# Patient Record
Sex: FEMALE | Race: BLACK OR AFRICAN AMERICAN | NOT HISPANIC OR LATINO | Employment: OTHER | URBAN - METROPOLITAN AREA
[De-identification: names, ages, dates, MRNs, and addresses within clinical notes are randomized per-mention and may not be internally consistent; named-entity substitution may affect disease eponyms.]

---

## 2018-10-30 ENCOUNTER — HISTORICAL (OUTPATIENT)
Dept: ADMINISTRATIVE | Facility: HOSPITAL | Age: 53
End: 2018-10-30

## 2018-10-30 LAB
APPEARANCE, UA: ABNORMAL
BACTERIA #/AREA URNS AUTO: ABNORMAL /HPF
BILIRUB UR QL STRIP: NEGATIVE MG/DL
COLOR UR: YELLOW
GLUCOSE (UA): NEGATIVE MG/DL
HGB UR QL STRIP: ABNORMAL UNIT/L
KETONES UR QL STRIP: NEGATIVE MG/DL
LEUKOCYTE ESTERASE UR QL STRIP: ABNORMAL UNIT/L
NITRITE UR QL STRIP.AUTO: NEGATIVE
PH UR STRIP: 7 [PH]
PROT UR QL STRIP: NEGATIVE MG/DL
RBC #/AREA URNS HPF: ABNORMAL /HPF
SP GR UR STRIP: 1.01
SQUAMOUS EPITHELIAL, UA: ABNORMAL /LPF
UROBILINOGEN UR STRIP-ACNC: 0.2 MG/DL
WBC #/AREA URNS AUTO: ABNORMAL /[HPF]

## 2019-11-14 ENCOUNTER — HISTORICAL (OUTPATIENT)
Dept: ADMINISTRATIVE | Facility: HOSPITAL | Age: 54
End: 2019-11-14

## 2019-11-14 LAB
ABS NEUT (OLG): 2.7 X10(3)/MCL (ref 2.1–9.2)
ALBUMIN SERPL-MCNC: 4.1 GM/DL (ref 3.4–5)
ALBUMIN/GLOB SERPL: 1.58 {RATIO} (ref 1.5–2.5)
ALP SERPL-CCNC: 46 UNIT/L (ref 38–126)
ALT SERPL-CCNC: 10 UNIT/L (ref 7–52)
AST SERPL-CCNC: 17 UNIT/L (ref 15–37)
BILIRUB SERPL-MCNC: 0.5 MG/DL (ref 0.2–1)
BILIRUBIN DIRECT+TOT PNL SERPL-MCNC: 0.1 MG/DL (ref 0–0.5)
BILIRUBIN DIRECT+TOT PNL SERPL-MCNC: 0.4 MG/DL
BUN SERPL-MCNC: 17 MG/DL (ref 7–18)
CALCIUM SERPL-MCNC: 9.2 MG/DL (ref 8.5–10)
CHLORIDE SERPL-SCNC: 108 MMOL/L (ref 98–107)
CHOLEST SERPL-MCNC: 196 MG/DL (ref 0–200)
CHOLEST/HDLC SERPL: 2.5 {RATIO}
CO2 SERPL-SCNC: 26 MMOL/L (ref 21–32)
CREAT SERPL-MCNC: 0.8 MG/DL (ref 0.6–1.3)
ERYTHROCYTE [DISTWIDTH] IN BLOOD BY AUTOMATED COUNT: 14.2 % (ref 11.5–17)
GLOBULIN SER-MCNC: 2.6 GM/DL (ref 1.2–3)
GLUCOSE SERPL-MCNC: 113 MG/DL (ref 74–106)
HCT VFR BLD AUTO: 38.7 % (ref 37–47)
HDLC SERPL-MCNC: 77 MG/DL (ref 35–60)
HGB BLD-MCNC: 12.9 GM/DL (ref 12–16)
LDLC SERPL CALC-MCNC: 104 MG/DL (ref 0–129)
LYMPHOCYTES # BLD AUTO: 2 X10(3)/MCL (ref 0.6–3.4)
LYMPHOCYTES NFR BLD AUTO: 40.1 % (ref 13–40)
MCH RBC QN AUTO: 30 PG (ref 27–31.2)
MCHC RBC AUTO-ENTMCNC: 33 GM/DL (ref 32–36)
MCV RBC AUTO: 90 FL (ref 80–94)
MONOCYTES # BLD AUTO: 0.4 X10(3)/MCL (ref 0.1–1.3)
MONOCYTES NFR BLD AUTO: 7.3 % (ref 0.1–24)
NEUTROPHILS NFR BLD AUTO: 52.6 % (ref 47–80)
PLATELET # BLD AUTO: 167 X10(3)/MCL (ref 130–400)
PMV BLD AUTO: 9.9 FL (ref 9.4–12.4)
POTASSIUM SERPL-SCNC: 4.6 MMOL/L (ref 3.5–5.1)
PROT SERPL-MCNC: 6.7 GM/DL (ref 6.4–8.2)
RBC # BLD AUTO: 4.3 X10(6)/MCL (ref 4.2–5.4)
SODIUM SERPL-SCNC: 141 MMOL/L (ref 136–145)
TRIGL SERPL-MCNC: 60 MG/DL (ref 30–150)
TSH SERPL-ACNC: 0.45 MIU/ML (ref 0.35–4.94)
VLDLC SERPL CALC-MCNC: 12 MG/DL
WBC # SPEC AUTO: 5.1 X10(3)/MCL (ref 4.5–11.5)

## 2020-05-19 ENCOUNTER — HISTORICAL (OUTPATIENT)
Dept: ADMINISTRATIVE | Facility: HOSPITAL | Age: 55
End: 2020-05-19

## 2020-05-19 LAB
ALBUMIN SERPL-MCNC: 4.4 GM/DL (ref 3.4–5)
ALBUMIN/GLOB SERPL: 1.42 {RATIO} (ref 1.5–2.5)
ALP SERPL-CCNC: 45 UNIT/L (ref 38–126)
ALT SERPL-CCNC: 14 UNIT/L (ref 7–52)
AST SERPL-CCNC: 21 UNIT/L (ref 15–37)
BILIRUB SERPL-MCNC: 0.4 MG/DL (ref 0.2–1)
BILIRUBIN DIRECT+TOT PNL SERPL-MCNC: 0.1 MG/DL (ref 0–0.5)
BILIRUBIN DIRECT+TOT PNL SERPL-MCNC: 0.3 MG/DL
BUN SERPL-MCNC: 16 MG/DL (ref 7–18)
CALCIUM SERPL-MCNC: 9.9 MG/DL (ref 8.5–10)
CHLORIDE SERPL-SCNC: 106 MMOL/L (ref 98–107)
CO2 SERPL-SCNC: 27 MMOL/L (ref 21–32)
CREAT SERPL-MCNC: 0.88 MG/DL (ref 0.6–1.3)
GLOBULIN SER-MCNC: 3.1 GM/DL (ref 1.2–3)
GLUCOSE SERPL-MCNC: 96 MG/DL (ref 74–106)
POTASSIUM SERPL-SCNC: 4.6 MMOL/L (ref 3.5–5.1)
PROT SERPL-MCNC: 7.5 GM/DL (ref 6.4–8.2)
SODIUM SERPL-SCNC: 141 MMOL/L (ref 136–145)
TSH SERPL-ACNC: 0.67 MIU/ML (ref 0.35–4.94)

## 2021-02-26 ENCOUNTER — HISTORICAL (OUTPATIENT)
Dept: ADMINISTRATIVE | Facility: HOSPITAL | Age: 56
End: 2021-02-26

## 2021-05-10 ENCOUNTER — HISTORICAL (OUTPATIENT)
Dept: ADMINISTRATIVE | Facility: HOSPITAL | Age: 56
End: 2021-05-10

## 2021-05-10 LAB
ABS NEUT (OLG): 3.6 X10(3)/MCL (ref 2.1–9.2)
ALBUMIN SERPL-MCNC: 4.4 GM/DL (ref 3.4–5)
ALBUMIN/GLOB SERPL: 1.69 {RATIO} (ref 1.5–2.5)
ALP SERPL-CCNC: 49 UNIT/L (ref 38–126)
ALT SERPL-CCNC: 14 UNIT/L (ref 7–52)
AST SERPL-CCNC: 19 UNIT/L (ref 15–37)
BILIRUB SERPL-MCNC: 0.6 MG/DL (ref 0.2–1)
BILIRUBIN DIRECT+TOT PNL SERPL-MCNC: 0.1 MG/DL (ref 0–0.5)
BILIRUBIN DIRECT+TOT PNL SERPL-MCNC: 0.5 MG/DL
BUN SERPL-MCNC: 18 MG/DL (ref 7–18)
CALCIUM SERPL-MCNC: 9.7 MG/DL (ref 8.5–10)
CHLORIDE SERPL-SCNC: 106 MMOL/L (ref 98–107)
CHOLEST SERPL-MCNC: 242 MG/DL (ref 0–200)
CHOLEST/HDLC SERPL: 2.8 {RATIO}
CO2 SERPL-SCNC: 28 MMOL/L (ref 21–32)
CREAT SERPL-MCNC: 0.77 MG/DL (ref 0.6–1.3)
ERYTHROCYTE [DISTWIDTH] IN BLOOD BY AUTOMATED COUNT: 15.2 % (ref 11.5–17)
EST CREAT CLEARANCE SER (OHS): 93.89 ML/MIN
GLOBULIN SER-MCNC: 2.6 GM/DL (ref 1.2–3)
GLUCOSE SERPL-MCNC: 104 MG/DL (ref 74–106)
HCT VFR BLD AUTO: 37.1 % (ref 37–47)
HDLC SERPL-MCNC: 85 MG/DL (ref 35–60)
HGB BLD-MCNC: 11.8 GM/DL (ref 12–16)
LDLC SERPL CALC-MCNC: 133 MG/DL (ref 0–129)
LYMPHOCYTES # BLD AUTO: 2.3 X10(3)/MCL (ref 0.6–3.4)
LYMPHOCYTES NFR BLD AUTO: 36.9 % (ref 13–40)
MCH RBC QN AUTO: 28.7 PG (ref 27–31.2)
MCHC RBC AUTO-ENTMCNC: 32 GM/DL (ref 32–36)
MCV RBC AUTO: 90 FL (ref 80–94)
MONOCYTES # BLD AUTO: 0.4 X10(3)/MCL (ref 0.1–1.3)
MONOCYTES NFR BLD AUTO: 6.5 % (ref 0.1–24)
NEUTROPHILS NFR BLD AUTO: 56.6 % (ref 47–80)
PLATELET # BLD AUTO: 221 X10(3)/MCL (ref 130–400)
PMV BLD AUTO: 10.2 FL (ref 9.4–12.4)
POTASSIUM SERPL-SCNC: 4.5 MMOL/L (ref 3.5–5.1)
PROT SERPL-MCNC: 7 GM/DL (ref 6.4–8.2)
RBC # BLD AUTO: 4.11 X10(6)/MCL (ref 4.2–5.4)
SODIUM SERPL-SCNC: 142 MMOL/L (ref 136–145)
TRIGL SERPL-MCNC: 88 MG/DL (ref 30–150)
TSH SERPL-ACNC: 0.69 MIU/ML (ref 0.35–4.94)
VLDLC SERPL CALC-MCNC: 17.6 MG/DL
WBC # SPEC AUTO: 6.3 X10(3)/MCL (ref 4.5–11.5)

## 2022-04-10 ENCOUNTER — HISTORICAL (OUTPATIENT)
Dept: ADMINISTRATIVE | Facility: HOSPITAL | Age: 57
End: 2022-04-10
Payer: COMMERCIAL

## 2022-04-26 VITALS
DIASTOLIC BLOOD PRESSURE: 74 MMHG | SYSTOLIC BLOOD PRESSURE: 118 MMHG | HEIGHT: 66 IN | WEIGHT: 160.69 LBS | BODY MASS INDEX: 25.83 KG/M2

## 2022-05-02 NOTE — HISTORICAL [facility]
This is a historical note converted from Clary. Formatting and pictures may have been removed.  Please reference Clary for original formatting and attached multimedia. Chief Complaint  WELLNESS CPX FAST  History of Present Illness  56 year old AAF presents for annual wellness.  ?   c/o recurrent yeast infections. ?Admits that she is?bathing, using?heavily?perfumed?soaps?and washes.  ?   Mood stable on citalopram.? ADHD?well managed with?as needed?Adderall.  ?  ?   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 EtOH socially  Exercising with son (he is a ),.  ?   GYN- Dr. Bonilla- PAP UTD; MMG > 5yrs.  Colonoscopy 7/20- Dr. Winston, DUE 2027.  Review of Systems  Constitutional:?no fever, no fatigue, no weakness  Psych: no anxiety,?no?depression, no insomnia  Eye:?no vision loss, no eye redness, no drainage, or pain  ENMT:?no sore throat, no ear pain, no sinus pain/congestion  Respiratory:?no cough, no wheezing, no shortness of breath  Cardiovascular:?no chest pain, no palpitations, no edema  Gastrointestinal:?no abdominal pain, no nausea, vomiting, diarrhea or constipation  Genitourinary:?no urinary frequency,? urgency, or incontinence, no dysuria, no hematuria  Hema/Lymph:?no abnormal bruising or bleeding  Endocrine:?no heat or cold intolerance, no excessive thirst or excessive urination  Musculoskeletal:?no muscle or joint pain, no joint swelling  Integumentary:?no skin rash or abnormal lesion  Neurologic: no headache, no dizziness, no weakness or numbness  ?  Physical Exam  Vitals & Measurements  T:?36.6? ?C (Oral)? HR:?64(Peripheral)? BP:?118/74?  HT:?167.00?cm? HT:?167?cm? WT:?72.900?kg? WT:?72.9?kg? BMI:?26.14?  General:?well-developed well-nourished in no acute distress  Eye: PERRLA, EOMI, clear conjunctiva, eyelids normal  HENT:?TMs/ear canals clear, oropharynx without erythema/exudate, oropharynx and nasal mucosal surfaces moist, no maxillary/frontal sinus tenderness to  palpation  Neck: full range of motion, no thyromegaly, no?lymphadenopathy, no carotid bruits  Respiratory:?respirations even and unlabored, clear to auscultation bilaterally  Cardiovascular:?regular rate and rhythm without murmurs, gallops or rubs  Gastrointestinal:?soft, non-tender, non-distended with normal bowel sounds, no palpable masses  Genitourinary: no CVA tenderness  Musculoskeletal:?full range of motion of all extremities/spine without limitation or discomfort  Integumentary: no rashes or skin lesions present  Neurologic: cranial nerves intact, no signs of peripheral neurological deficit, motor/sensory function intact  ?  Assessment/Plan  1.?Encounter for wellness examination?Z00.00  Fasting?labs completed (CBC, CMP, TSH, FLP); will call patient with results.???  ?   Preventative: We discussed a? healthy diet (healthy food choices, reduce portions and overall calorie intake), continue to exercise?at least?30-45 minutes 3x per week,?Avoid excessive alcohol.??Immunizations and preventative exams discussed.  ?   MMG overdue.? Order?in chart.  Ordered:  Automated Diff, Routine collect, 05/10/21 14:08:00 CDT, Blood, Collected, Stop date 05/10/21 14:08:00 CDT, Lab Collect, Encounter for wellness examination, 05/10/21 14:08:00 CDT  CBC w/ Auto Diff, Routine collect, 05/10/21 14:08:00 CDT, Blood, Stop date 05/10/21 14:08:00 CDT, Lab Collect, Encounter for wellness examination, 05/10/21 14:08:00 CDT  Comprehensive Metabolic Panel, Routine collect, 05/10/21 14:08:00 CDT, Blood, Stop date 05/10/21 14:08:00 CDT, Lab Collect, Encounter for wellness examination, 05/10/21 14:08:00 CDT  Lipid Panel, Routine collect, 05/10/21 14:08:00 CDT, Blood, Stop date 05/10/21 14:08:00 CDT, Lab Collect, Encounter for wellness examination, 05/10/21 14:08:00 CDT  Thyroid Stimulating Hormone, Routine collect, 05/10/21 14:08:00 CDT, Blood, Stop date 05/10/21 14:08:00 CDT, Lab Collect, Encounter for wellness examination, 05/10/21 14:08:00  CDT  ?  2.?ADHD?F90.9  ?Continue?Adderall 10 mg?every morning?-Rx x1.  Patient will contact office?when refill needed.  Ordered:  Clinic Follow up, *Est. 08/10/21 3:00:00 CDT, Order for future visit, ADHD  Depression, [facility]  Lab Collection Request, 05/10/21 13:52:00 CDT, [facility], 05/10/21 13:52:00 CDT, ADHD  [facility] 40-64 years [id number] PC, ADHD, [facility], 05/10/21 13:52:00 CDT  ?  3.?Depression?F32.9  ?Continue/refill citalopram 20 mg daily.  Ordered:  Clinic Follow up, *Est. 08/10/21 3:00:00 CDT, Order for future visit, ADHD  Depression, [facility]  ?  4.?Candida vaginitis?B37.3  ?Diflucan 150 mg?daily?x3 days.  Lengthy discussion with patient?to limit?bathing,?avoid?perfumed soaps?and lotions, rinse well.  Patient instructed to contact office if symptoms worsen or fail to improve.  ?  Orders:  amphetamine-dextroamphetamine, 10 mg = 1 tab(s), Oral, qAM, # 30 tab(s), 0 Refill(s)  citalopram, See Instructions, TAKE 1 TABLET BY MOUTH EVERY DAY, # 30 tab(s), 5 Refill(s), Pharmacy: [facility]/pharmacy #[id number], 167, cm, Height/Length Dosing, 05/10/21 13:48:00 CDT, 72.9, kg, Weight Dosing, 05/10/21 13:48:00 CDT  fluconazole, 150 mg = 1 tab(s), Oral, Daily, # 3 tab(s), 0 Refill(s), Pharmacy: [facility]/pharmacy #[id number], 167, cm, Height/Length Dosing, 05/10/21 13:48:00 CDT, 72.9, kg, Weight Dosing, 05/10/21 13:48:00 CDT  Schedule Diagnostics Study, MMG, any, 05/10/21 14:11:00 CDT, Screening for breast cancer  Referrals  Clinic Follow up, *Est. 08/10/21 3:00:00 CDT, Order for future visit, ADHD  Depression, [facility]   Problem List/Past Medical History  Ongoing  ADHD  Depression  Uterine fibroid  Historical  No qualifying data  Procedure/Surgical History  Colonoscopy (07/15/2020)  RIGHT KNEE SURGERY   Medications  Adderall 10 mg oral tablet, 10 mg= 1 tab(s), Oral, qAM  citalopram 20 mg oral tablet, See Instructions, 5 refills  Diflucan 150 mg oral tablet, 150 mg= 1 tab(s), Oral, Daily  multivitamin with  minerals (Adult Tab), 1 tab(s), Oral, Daily  Allergies  penicillin?(UNKNOWN)  Social History  Abuse/Neglect  No, 05/10/2021  Alcohol  Current, 3-5 times per week, 10/30/2018  Employment/School  Employed, 10/30/2018  Exercise  Exercise duration: 60. Exercise frequency: 3-4 times/week. Exercise type: Aerobics, Weight lifting., 05/10/2021  Home/Environment  Lives with Children., 10/30/2018  Nutrition/Health  Regular, 10/30/2018  Substance Use  Never, 10/30/2018  Tobacco  Former smoker, quit more than 30 days ago, N/A, 05/10/2021  4 or less cigarettes(less than 1/4 pack)/day in last 30 days, Cigarettes, No, 01/25/2021  Family History  Acute myocardial infarction.: Mother and Grandmother.  Immunizations  Vaccine Date Status   influenza virus vaccine, inactivated 11/14/2019 Given   influenza virus vaccine, inactivated 10/30/2018 Given   Health Maintenance  Health Maintenance  ???Pending?(in the next year)  ??? ??OverDue  ??? ? ? ?Influenza Vaccine due??10/01/20??and every 1??day(s)  ??? ? ? ?Alcohol Misuse Screening due??01/02/21??and every 1??year(s)  ??? ??Due?  ??? ? ? ?ADL Screening due??05/10/21??and every 1??year(s)  ??? ? ? ?Breast Cancer Screening due??05/10/21??Unknown Frequency  ??? ? ? ?Tetanus Vaccine due??05/10/21??and every 10??year(s)  ??? ? ? ?Zoster Vaccine due??05/10/21??Unknown Frequency  ??? ??Due In Future?  ??? ? ? ?Obesity Screening not due until??01/01/22??and every 1??year(s)  ??? ? ? ?Aspirin Therapy for CVD Prevention not due until??02/26/22??and every 1??year(s)  ???Satisfied?(in the past 1 year)  ??? ??Satisfied?  ??? ? ? ?Aspirin Therapy for CVD Prevention on??02/26/21.??Satisfied by LEXI Bill Teddi  ??? ? ? ?Blood Pressure Screening on??05/10/21.??Satisfied by Erika Ambriz LPN  ??? ? ? ?Body Mass Index Check on??05/10/21.??Satisfied by Erika Ambriz LPN  ??? ? ? ?Cervical Cancer Screening on??11/24/20.??Satisfied by Jovan Mark  ??? ? ? ?Colorectal Screening  on??07/15/20.??Satisfied by Rae Cox  ??? ? ? ?Depression Screening on??05/10/21.??Satisfied by Erika Ambriz LPN  ??? ? ? ?Diabetes Screening on??05/19/20.??Satisfied by Fanny Ramirez  ??? ? ? ?Influenza Vaccine on??02/26/21.??Satisfied by Erika Ambriz LPN  ??? ? ? ?Obesity Screening on??05/10/21.??Satisfied by Erika Ambriz LPN  ?      Patient condition discussed?in detail with nurse practitioner.? Agree with plan of care?and follow-up.

## 2022-05-02 NOTE — HISTORICAL [facility]
This is a historical note converted from Clary. Formatting and pictures may have been removed.  Please reference Clary for original formatting and attached multimedia. Chief Complaint  6 MTH HAIR LOSS AND VERY ITCHY SCALP, HAS LOST ALL OF HER, HAVING SEVERE YEAST INFECTION, HX FIBROID TUMORS NEVER F/U  History of Present Illness  55 year old AAF complains of hair falling out that has worsened over past 6 months. Hair is very brittle and falls out by the clumps. Only change is she dyed her hair about 6 months ago. No change in day to day hair products. Scalp has also been very itchy past 2 weeks.  Review of Systems  Constitutional:?no fever, no weakness, no weight loss, no fatigue  Musculoskeletal:?no muscle or joint pain, no joint swelling  Integumentary:?See HPI  Neuro:?no headaches, dizziness, or weakness  Physical Exam  Vitals & Measurements  T:?36.6? ?C (Oral)? HR:?60(Peripheral)? BP:?122/76?  HT:?167?cm? WT:?66.6?kg? BMI:?23.88?  General:?Well developed, well-nourished, in no acute distress  M/S:?no tenderness, joints WNL, FROM, neg SLR, no CCE  Neuro:?no motor/sensory deficits, Reflexes 2+ throughout, CN II-XII intact  Integumentary:?slight erythema to posterior scalp  ?? No alopecia noted  Assessment/Plan  1.?Hair loss?L65.9  ?CMP, TSH  Ordered:  Comprehensive Metabolic Panel, Now collect, 05/19/20 16:11:00 CDT, Blood, Order for future visit, Stop date 05/19/20 16:11:00 CDT, Lab Collect, Hair loss, 05/19/20 16:11:00 CDT  Lab Collection Request, 05/19/20 16:11:00 CDT, [facility], 05/19/20 16:11:00 CDT, Hair loss  Office/Outpatient Visit Level 3 Established [id number] PC, Hair loss  Seborrheic dermatitis of scalp, [facility], 05/19/20 16:11:00 CDT  Thyroid Stimulating Hormone, Routine collect, 05/19/20 16:11:00 CDT, Blood, Order for future visit, Stop date 05/19/20 16:11:00 CDT, Lab Collect, Hair loss, 05/19/20 16:11:00 CDT  ?  2.?Seborrheic dermatitis of scalp?L21.9  ?Clobetasol 0.05% shampoo as  directed (118ml, NR)  Ordered:  Office/Outpatient Visit Level 3 Established [id number] PC, Hair loss  Seborrheic dermatitis of scalp, [facility], 05/19/20 16:11:00 CDT  ?  Orders:  clobetasol topical, 1 rajeev, TOP, Daily, # 118 mL, 1 Refill(s), Pharmacy: [facility]/pharmacy #[id number], 167, cm, Height/Length Dosing, 05/19/20 15:36:00 CDT, 66.6, kg, Weight Dosing, 05/19/20 15:36:00 CDT  Clinic Follow-up PRN, 05/19/20 16:11:00 CDT, [facility], Future Order  Referrals  Clinic Follow-up PRN, 05/19/20 16:11:00 CDT, [facility], Future Order   Problem List/Past Medical History  Ongoing  Uterine fibroid  Historical  No qualifying data  Procedure/Surgical History  RIGHT KNEE SURGERY   Medications  Adderall 10 mg oral tablet, 10 mg= 1 tab(s), Oral, qAM  biotin 300 mcg oral tablet, 300 mcg= 1 tab(s), Oral, Daily  clobetasol 0.05% topical shampoo, 1 rajeev, TOP, Daily, 1 refills  Ibuprofen 400 mg tablet, 2 tab(s), Oral, q4hr, PRN  multivitamin with minerals (Adult Tab), 1 tab(s), Oral, Daily  Zoloft 25 mg oral tablet, 25 mg= 1 tab(s), Oral, Daily, 5 refills  Allergies  penicillin?(UNKNOWN)  Social History  Abuse/Neglect  No, 05/19/2020  Alcohol  Current, 3-5 times per week, 10/30/2018  Employment/School  Employed, 10/30/2018  Home/Environment  Lives with Children., 10/30/2018  Nutrition/Health  Regular, 10/30/2018  Substance Use  Never, 10/30/2018  Tobacco  4 or less cigarettes(less than 1/4 pack)/day in last 30 days, No, 05/19/2020  4 or less cigarettes(less than 1/4 pack)/day in last 30 days, N/A, 01/08/2019  Current every day smoker, Cigarettes, No, 2 per day., 11/27/2018  Family History  Acute myocardial infarction.: Mother and Grandmother.  Immunizations  Vaccine Date Status   influenza virus vaccine, inactivated 11/14/2019 Given   influenza virus vaccine, inactivated 10/30/2018 Given   Health Maintenance  Health Maintenance  ???Pending?(in the next year)  ??? ??OverDue  ??? ? ? ?Diabetes Screening due??and every?  ??? ? ?  ?Alcohol Misuse Screening due??01/01/20??and every 1??year(s)  ??? ? ? ?Smoking Cessation due??01/01/20??Variable frequency  ??? ??Due?  ??? ? ? ?ADL Screening due??05/19/20??and every 1??year(s)  ??? ? ? ?Aspirin Therapy for CVD Prevention due??05/19/20??and every 1??year(s)  ??? ? ? ?Breast Cancer Screening due??05/19/20??and every?  ??? ? ? ?Cervical Cancer Screening due??05/19/20??and every?  ??? ? ? ?Colorectal Screening due??05/19/20??and every?  ??? ? ? ?Lung Cancer Screening due??05/19/20??and every 1??year(s)  ??? ? ? ?Tetanus Vaccine due??05/19/20??and every 10??year(s)  ??? ??Due In Future?  ??? ? ? ?Blood Pressure Screening not due until??11/13/20??and every 1??year(s)  ??? ? ? ?Body Mass Index Check not due until??11/13/20??and every 1??year(s)  ??? ? ? ?Depression Screening not due until??11/13/20??and every 1??year(s)  ??? ? ? ?Obesity Screening not due until??01/01/21??and every 1??year(s)  ???Satisfied?(in the past 1 year)  ??? ??Satisfied?  ??? ? ? ?Blood Pressure Screening on??05/19/20.??Satisfied by Erika Ambriz LPN  ??? ? ? ?Body Mass Index Check on??05/19/20.??Satisfied by Erika Ambriz LPN  ??? ? ? ?Depression Screening on??05/19/20.??Satisfied by Erika Ambriz LPN  ??? ? ? ?Diabetes Screening on??11/14/19.??Satisfied by Fanny Ramirez  ??? ? ? ?Influenza Vaccine on??11/14/19.??Satisfied by Norma Ramirze CMA  ??? ? ? ?Lipid Screening on??11/14/19.??Satisfied by Fanny Ramirez  ??? ? ? ?Obesity Screening on??05/19/20.??Satisfied by Erika Ambriz LPN  ?
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
## 2022-05-02 NOTE — HISTORICAL [facility]
This is a historical note converted from [place]. Formatting and pictures may have been removed.  Please reference [place] for original formatting and attached multimedia. Chief Complaint  NP C/O BURNING WITH URINATION, ITCHING, LOWER BACK PAIN, STAYS WITH YEAST INFECTION.  History of Present Illness  Complains of urinary urgency, dysuria x 1 week.  ?  Complains of worsening anxiety and depression since sudden death of  few weeks ago. Admits to having significant anxiety for past 10-15 years, just much worse since husbands death. Would like to start medicine since she is now having to deal with all finances for family and is very overwhelmed.  Review of Systems  Constitutional:?no fever, fatigue, weakness  Gastrointestinal:?no nausea, vomiting, or diarrhea. No abdominal pain  Genitourinary:?+ dysuria,?+ urinary frequency?+ urgency, no hematuria  Hema/Lymph:?no abnormal bruising or bleeding  Endocrine:?no heat or cold intolerance, no excessive thirst or excessive urination  Musculoskeletal:?no muscle or joint pain, no joint swelling  Integumentary:?no skin rash or abnormal lesion  Neurologic: no headache, no dizziness, no weakness or numbness  ?  Physical Exam  Vitals & Measurements  T:?36.5? ?C (Oral)? HR:?64(Peripheral)? BP:?126/84?  HT:?170?cm? HT:?170?cm? WT:?65.5?kg? WT:?65.5?kg? BMI:?22.66?  General:?well-developed well-nourished in no acute distress  Gastrointestinal:?soft, non-tender, non-distended with normal bowel sounds, without masses to palpation  Genitourinary: no CVA tenderness to palpation  Musculoskeletal:?full range of motion of all extremities/spine without limitation or discomfort  Integumentary: no rashes or skin lesions present  Neurologic: cranial nerves intact, no signs of peripheral neurological deficit, motor/sensory function intact  ?  Assessment/Plan  1.?Acute UTI  ?Cipro 500mg PO BID (10, NR)  ?Decrease caffeine, increase water  Ordered:  Office/Outpatient Visit Level 3 New [id number] ,  Acute UTI  Anxiety, [facility], 10/30/18 14:34:00 CDT  ?  2.?Anxiety  ?Recent death of  but also with significant anxiety for many years  ?Start Celexa 20mg PO q day  Ordered:  Clinic Follow up, *Est. 11/27/18 3:00:00 CST, Order for future visit, Anxiety, [facility]  Office/Outpatient Visit Level 3 New [id number] PC, Acute UTI  Anxiety, [facility], 10/30/18 14:34:00 CDT  ?  Needs flu shot  ?  UTI symptoms  ?  Orders:  ciprofloxacin, 500 mg = 1 tab(s), Oral, q12hr, X 7 day(s), # 14 tab(s), 0 Refill(s), Pharmacy: [facility]/pharmacy #[id number]  citalopram, 20 mg = 1 tab(s), Oral, Daily, # 30 tab(s), 5 Refill(s), Pharmacy: [facility]/pharmacy #[id number]   Problem List/Past Medical History  Ongoing  Uterine fibroid  Historical  No qualifying data  Procedure/Surgical History  RIGHT KNEE SURGERY   Medications  biotin 300 mcg oral tablet, 300 mcg= 1 tab(s), Oral, Daily  Celexa 20 mg oral tablet, 20 mg= 1 tab(s), Oral, Daily, 5 refills  Cipro 500 mg oral tablet, 500 mg= 1 tab(s), Oral, q12hr  Ibuprofen 400 mg tablet, 2 tab(s), Oral, q4hr, PRN  multivitamin with minerals (Adult Tab), 1 tab(s), Oral, Daily  Allergies  penicillin?(UNKNOWN)  Social History  Alcohol  Current, 3-5 times per week, 10/30/2018  Employment/School  Employed, 10/30/2018  Home/Environment  Lives with Children., 10/30/2018  Nutrition/Health  Regular, 10/30/2018  Substance Abuse  Never, 10/30/2018  Tobacco  Current every day smoker, Cigarettes, No, 2 per day., 10/30/2018  Family History  Acute myocardial infarction.: Mother and Grandmother.  Immunizations  Vaccine Date Status   influenza virus vaccine, inactivated 10/30/2018 Given   Health Maintenance  Health Maintenance  ???Pending?(in the next year)  ??? ??Due?  ??? ? ? ?ADL Screening due??10/30/18??and every 1??year(s)  ??? ? ? ?Alcohol Misuse Screening due??10/30/18??and every 1??year(s)  ??? ? ? ?Smoking Cessation due??10/30/18??Variable frequency  ??? ? ? ?Tetanus Vaccine due??10/30/18??and every  10??year(s)  ???Satisfied?(in the past 1 year)  ??? ??Satisfied?  ??? ? ? ?Blood Pressure Screening on??10/30/18.??Satisfied by Erika Ambriz  ??? ? ? ?Body Mass Index Check on??10/30/18.??Satisfied by Erika Ambriz  ??? ? ? ?Influenza Vaccine on??10/30/18.??Satisfied by Erika Ambriz  ??? ? ? ?Obesity Screening on??10/30/18.??Satisfied by Erika Ambriz  ?  ?

## 2022-05-02 NOTE — HISTORICAL [facility]ER
This is a historical note converted from [employer]. Formatting and pictures may have been removed.  Please reference [employer] for original formatting and attached multimedia. Chief Complaint  CPX FASTING / EVAL LEFT KNEE  History of Present Illness  54 year old AAF presents for annual wellness  No PMH, No Meds  ?  , 2 kids, Works as caretaker for [employer]  No Tob, No EtOH  No exercise  ?  Last MMG >5 years ago  No Colonoscopy to date  Review of Systems  Constitutional:?no fever, fatigue, weakness  Eye:?no vision loss, eye redness, drainage, or pain  ENMT:?no sore throat, ear pain, sinus pain/congestion, nasal congestion/drainage  Respiratory:?no cough, no wheezing, no shortness of breath  Cardiovascular:?no chest pain, no palpitations, no edema  Gastrointestinal:?no nausea, vomiting, or diarrhea. No abdominal pain  Genitourinary:?no dysuria, no urinary frequency or urgency, no hematuria  Hema/Lymph:?no abnormal bruising or bleeding  Endocrine:?no heat or cold intolerance, no excessive thirst or excessive urination  Musculoskeletal:?no muscle or joint pain, no joint swelling  Integumentary:?no skin rash or abnormal lesion  Neurologic: no headache, no dizziness, no weakness or numbness  ?  Physical Exam  Vitals & Measurements  T:?36.8? ?C (Oral)? HR:?66(Peripheral)? BP:?130/78?  HT:?167?cm? WT:?64.1?kg? BMI:?22.98?  General:?well-developed well-nourished in no acute distress  Eye: PERRLA, EOMI, clear conjunctiva, eyelids normal  HENT:?TMs/ear canals clear, oropharynx without erythema/exudate, oropharynx and nasal mucosal surfaces moist, no maxillary/frontal sinus tenderness to palpation  Neck: full range of motion, no thyromegaly or lymphadenopathy  Respiratory:?clear to auscultation bilaterally  Cardiovascular:?regular rate and rhythm without murmurs, gallops or rubs  Gastrointestinal:?soft, non-tender, non-distended with normal bowel sounds, without masses to palpation  Genitourinary: no CVA tenderness  to palpation  Musculoskeletal:?full range of motion of all extremities/spine without limitation or discomfort  Integumentary: no rashes or skin lesions present  Neurologic: cranial nerves intact, no signs of peripheral neurological deficit, motor/sensory function intact  ?  Assessment/Plan  1.?Wellness examination?Z00.00  ?LABS: CBC, CMP, TSH, FLP  Ordered:  CBC w/ Auto Diff, Routine collect, 11/14/19 10:56:00 CST, Blood, Order for future visit, Stop date 11/14/19 10:56:00 CST, Lab Collect, Wellness examination, 11/14/19 10:56:00 CST  Comprehensive Metabolic Panel, Now collect, 11/14/19 10:56:00 CST, Blood, Order for future visit, Stop date 11/14/19 10:56:00 CST, Lab Collect, Wellness examination, 11/14/19 10:56:00 CST  Lipid Panel, Routine collect, 11/14/19 10:56:00 CST, Blood, Order for future visit, Stop date 11/14/19 10:56:00 CST, Lab Collect, Wellness examination, 11/14/19 10:56:00 CST  Preventative Health Care Est 40-64 years [id number] PC, Wellness examination, [facility], 11/14/19 10:56:00 CST  Thyroid Stimulating Hormone, Routine collect, 11/14/19 10:56:00 CST, Blood, Order for future visit, Stop date 11/14/19 10:56:00 CST, Lab Collect, Wellness examination, 11/14/19 10:56:00 CST  ?  2.?Left knee pain?M25.562  ?Possible MCL tear due to instability and valgus deformity on xray  ?Also with Pes Anserine Bursitis  ?NSAIDs, Handout given for instructions/stretches  Ordered:  XR Knee Left 1 or 2 Views, Routine, 11/14/19 10:59:00 CST, Other (please specify), None, Ambulatory, Rad Type, Left knee pain, [facility], 11/14/19 10:59:00 CST  ?  Orders:  Clinic Follow-up PRN, 11/14/19 10:56:00 CST, [facility], Future Order  Lab Collection Request, 11/14/19 10:56:00 CST, [facility], 11/14/19 10:56:00 CST  Schedule Diagnostics Study, Screening colonoscopy, First available, 11/14/19 10:54:00 CST, Colon cancer screening  Refer for screening colonoscopy  Referrals  Clinic Follow-up PRN, 11/14/19  10:56:00 CST, [facility], Future Order   Problem List/Past Medical History  Ongoing  Uterine fibroid  Historical  No qualifying data  Procedure/Surgical History  RIGHT KNEE SURGERY   Medications  Adderall 10 mg oral tablet, 10 mg= 1 tab(s), Oral, qAM  biotin 300 mcg oral tablet, 300 mcg= 1 tab(s), Oral, Daily  Ibuprofen 400 mg tablet, 2 tab(s), Oral, q4hr, PRN  multivitamin with minerals (Adult Tab), 1 tab(s), Oral, Daily  Zoloft 25 mg oral tablet, 25 mg= 1 tab(s), Oral, Daily, 5 refills  Allergies  penicillin?(UNKNOWN)  Social History  Abuse/Neglect  No, 11/14/2019  Alcohol  Current, 3-5 times per week, 10/30/2018  Employment/School  Employed, 10/30/2018  Home/Environment  Lives with Children., 10/30/2018  Nutrition/Health  Regular, 10/30/2018  Substance Use  Never, 10/30/2018  Tobacco  4 or less cigarettes(less than 1/4 pack)/day in last 30 days, No, 11/14/2019  4 or less cigarettes(less than 1/4 pack)/day in last 30 days, N/A, 01/08/2019  Current every day smoker, Cigarettes, No, 2 per day., 11/27/2018  Family History  Acute myocardial infarction.: Mother and Grandmother.  Immunizations  Vaccine Date Status   influenza virus vaccine, inactivated 11/14/2019 Given   influenza virus vaccine, inactivated 10/30/2018 Given   Health Maintenance  Health Maintenance  ???Pending?(in the next year)  ??? ??OverDue  ??? ? ? ?Influenza Vaccine due??and every?  ??? ? ? ?Alcohol Misuse Screening due??01/01/19??and every 1??year(s)  ??? ? ? ?Smoking Cessation due??01/01/19??Variable frequency  ??? ??Due?  ??? ? ? ?ADL Screening due??11/14/19??and every 1??year(s)  ??? ? ? ?Breast Cancer Screening due??11/14/19??and every?  ??? ? ? ?Cervical Cancer Screening due??11/14/19??and every?  ??? ? ? ?Colorectal Screening due??11/14/19??and every?  ??? ? ? ?Diabetes Screening due??11/14/19??and every?  ??? ? ? ?Lipid Screening due??11/14/19??and every?  ??? ? ? ?Tetanus Vaccine due??11/14/19??and every 10??year(s)  ??? ??Due In  Future?  ??? ? ? ?Obesity Screening not due until??01/01/20??and every 1??year(s)  ??? ? ? ?Blood Pressure Screening not due until??11/13/20??and every 1??year(s)  ??? ? ? ?Body Mass Index Check not due until??11/13/20??and every 1??year(s)  ??? ? ? ?Depression Screening not due until??11/13/20??and every 1??year(s)  ???Satisfied?(in the past 1 year)  ??? ??Satisfied?  ??? ? ? ?Blood Pressure Screening on??11/14/19.??Satisfied by Norma Ramirez CMA  ??? ? ? ?Body Mass Index Check on??11/14/19.??Satisfied by Norma Ramirez CMA  ??? ? ? ?Depression Screening on??11/14/19.??Satisfied by Norma Ramirez CMA  ??? ? ? ?Influenza Vaccine on??11/14/19.??Satisfied by Norma Ramirez CMA  ??? ? ? ?Obesity Screening on??11/14/19.??Satisfied by Norma Ramirez CMA  ?      Patient condition discussed?in detail with nurse practitioner.? Agree with plan of care?and follow-up.

## 2022-11-19 ENCOUNTER — HOSPITAL ENCOUNTER (OUTPATIENT)
Facility: HOSPITAL | Age: 57
Discharge: HOME | End: 2022-11-20
Attending: EMERGENCY MEDICINE | Admitting: INTERNAL MEDICINE
Payer: COMMERCIAL

## 2022-11-19 DIAGNOSIS — R40.20 LOSS OF CONSCIOUSNESS: Primary | ICD-10-CM

## 2022-11-19 DIAGNOSIS — F19.90 SUBSTANCE USE: ICD-10-CM

## 2022-11-19 DIAGNOSIS — R56.9 WITNESSED SEIZURE-LIKE ACTIVITY: ICD-10-CM

## 2022-11-19 DIAGNOSIS — R55 SYNCOPE: ICD-10-CM

## 2022-11-19 DIAGNOSIS — F10.920 ACUTE ALCOHOLIC INTOXICATION WITHOUT COMPLICATION: ICD-10-CM

## 2022-11-19 LAB
ALBUMIN SERPL-MCNC: 4 GM/DL (ref 3.5–5)
ALBUMIN/GLOB SERPL: 1.3 RATIO (ref 1.1–2)
ALP SERPL-CCNC: 69 UNIT/L (ref 40–150)
ALT SERPL-CCNC: 11 UNIT/L (ref 0–55)
AMPHET UR QL SCN: NEGATIVE
APPEARANCE UR: CLEAR
AST SERPL-CCNC: 15 UNIT/L (ref 5–34)
AV INDEX (PROSTH): 0.63
AV MEAN GRADIENT: 4 MMHG
AV PEAK GRADIENT: 8 MMHG
AV VALVE AREA: 1.43 CM2
AV VELOCITY RATIO: 0.6
BACTERIA #/AREA URNS AUTO: NORMAL /HPF
BARBITURATE SCN PRESENT UR: NEGATIVE
BASOPHILS # BLD AUTO: 0.03 X10(3)/MCL (ref 0–0.2)
BASOPHILS NFR BLD AUTO: 0.5 %
BENZODIAZ UR QL SCN: NEGATIVE
BILIRUB UR QL STRIP.AUTO: NEGATIVE MG/DL
BILIRUBIN DIRECT+TOT PNL SERPL-MCNC: 0.3 MG/DL
BUN SERPL-MCNC: 9.4 MG/DL (ref 9.8–20.1)
CALCIUM SERPL-MCNC: 9.3 MG/DL (ref 8.4–10.2)
CANNABINOIDS UR QL SCN: POSITIVE
CHLORIDE SERPL-SCNC: 112 MMOL/L (ref 98–107)
CHOLEST SERPL-MCNC: 214 MG/DL
CHOLEST/HDLC SERPL: 3 {RATIO} (ref 0–5)
CO2 SERPL-SCNC: 23 MMOL/L (ref 22–29)
COCAINE UR QL SCN: POSITIVE
COLOR UR AUTO: YELLOW
CREAT SERPL-MCNC: 0.85 MG/DL (ref 0.55–1.02)
CV ECHO LV RWT: 0.27 CM
DOP CALC AO PEAK VEL: 1.41 M/S
DOP CALC AO VTI: 30.7 CM
DOP CALC LVOT AREA: 2.3 CM2
DOP CALC LVOT DIAMETER: 1.7 CM
DOP CALC LVOT PEAK VEL: 0.85 M/S
DOP CALC LVOT STROKE VOLUME: 44.01 CM3
DOP CALC MV VTI: 25.1 CM
DOP CALCLVOT PEAK VEL VTI: 19.4 CM
E WAVE DECELERATION TIME: 169 MSEC
E/A RATIO: 1.04
E/E' RATIO: 8.35 M/S
ECHO LV POSTERIOR WALL: 0.57 CM (ref 0.6–1.1)
EJECTION FRACTION: 60 %
EOSINOPHIL # BLD AUTO: 0.12 X10(3)/MCL (ref 0–0.9)
EOSINOPHIL NFR BLD AUTO: 2.2 %
ERYTHROCYTE [DISTWIDTH] IN BLOOD BY AUTOMATED COUNT: 14 % (ref 11.5–17)
ETHANOL SERPL-MCNC: 215 MG/DL
FENTANYL UR QL SCN: NEGATIVE
FRACTIONAL SHORTENING: 26 % (ref 28–44)
GFR SERPLBLD CREATININE-BSD FMLA CKD-EPI: >60 MLS/MIN/1.73/M2
GLOBULIN SER-MCNC: 3.1 GM/DL (ref 2.4–3.5)
GLUCOSE SERPL-MCNC: 102 MG/DL (ref 74–100)
GLUCOSE UR QL STRIP.AUTO: NEGATIVE MG/DL
HCT VFR BLD AUTO: 39.4 % (ref 37–47)
HDLC SERPL-MCNC: 66 MG/DL (ref 35–60)
HGB BLD-MCNC: 12.7 GM/DL (ref 12–16)
IMM GRANULOCYTES # BLD AUTO: 0.01 X10(3)/MCL (ref 0–0.04)
IMM GRANULOCYTES NFR BLD AUTO: 0.2 %
INTERVENTRICULAR SEPTUM: 1.08 CM (ref 0.6–1.1)
KETONES UR QL STRIP.AUTO: NEGATIVE MG/DL
LDLC SERPL CALC-MCNC: 121 MG/DL (ref 50–140)
LEFT ATRIUM SIZE: 2.9 CM
LEFT ATRIUM VOLUME MOD: 57.2 CM3
LEFT INTERNAL DIMENSION IN SYSTOLE: 3.2 CM (ref 2.1–4)
LEFT VENTRICLE DIASTOLIC VOLUME: 83.1 ML
LEFT VENTRICLE SYSTOLIC VOLUME: 41 ML
LEFT VENTRICULAR INTERNAL DIMENSION IN DIASTOLE: 4.3 CM (ref 3.5–6)
LEFT VENTRICULAR MASS: 109.71 G
LEUKOCYTE ESTERASE UR QL STRIP.AUTO: NEGATIVE UNIT/L
LV LATERAL E/E' RATIO: 6.86 M/S
LV SEPTAL E/E' RATIO: 10.67 M/S
LVOT MG: 1 MMHG
LVOT MV: 0.55 CM/S
LYMPHOCYTES # BLD AUTO: 3.09 X10(3)/MCL (ref 0.6–4.6)
LYMPHOCYTES NFR BLD AUTO: 56.1 %
MCH RBC QN AUTO: 29.1 PG (ref 27–31)
MCHC RBC AUTO-ENTMCNC: 32.2 MG/DL (ref 33–36)
MCV RBC AUTO: 90.2 FL (ref 80–94)
MDMA UR QL SCN: NEGATIVE
MONOCYTES # BLD AUTO: 0.3 X10(3)/MCL (ref 0.1–1.3)
MONOCYTES NFR BLD AUTO: 5.4 %
MV MEAN GRADIENT: 2 MMHG
MV PEAK A VEL: 0.92 M/S
MV PEAK E VEL: 0.96 M/S
MV PEAK GRADIENT: 4 MMHG
MV STENOSIS PRESSURE HALF TIME: 57 MS
MV VALVE AREA BY CONTINUITY EQUATION: 1.75 CM2
MV VALVE AREA P 1/2 METHOD: 3.86 CM2
NEUTROPHILS # BLD AUTO: 2 X10(3)/MCL (ref 2.1–9.2)
NEUTROPHILS NFR BLD AUTO: 35.6 %
NITRITE UR QL STRIP.AUTO: NEGATIVE
NRBC BLD AUTO-RTO: 0 %
OPIATES UR QL SCN: NEGATIVE
PCP UR QL: NEGATIVE
PH UR STRIP.AUTO: 6 [PH]
PH UR: 6 [PH] (ref 3–11)
PISA TR MAX VEL: 2.45 M/S
PLATELET # BLD AUTO: 201 X10(3)/MCL (ref 130–400)
PMV BLD AUTO: 11.1 FL (ref 7.4–10.4)
POTASSIUM SERPL-SCNC: 3.8 MMOL/L (ref 3.5–5.1)
PROT SERPL-MCNC: 7.1 GM/DL (ref 6.4–8.3)
PROT UR QL STRIP.AUTO: NEGATIVE MG/DL
PV PEAK VELOCITY: 0.8 CM/S
RA PRESSURE: 8 MMHG
RBC # BLD AUTO: 4.37 X10(6)/MCL (ref 4.2–5.4)
RBC #/AREA URNS AUTO: NORMAL /HPF
RBC UR QL AUTO: ABNORMAL UNIT/L
RIGHT VENTRICULAR END-DIASTOLIC DIMENSION: 2.26 CM
SODIUM SERPL-SCNC: 148 MMOL/L (ref 136–145)
SP GR UR STRIP.AUTO: 1 (ref 1–1.03)
SPECIFIC GRAVITY, URINE AUTO (.000) (OHS): 1 (ref 1–1.03)
SQUAMOUS #/AREA URNS AUTO: NORMAL /HPF
TDI LATERAL: 0.14 M/S
TDI SEPTAL: 0.09 M/S
TDI: 0.12 M/S
TR MAX PG: 24 MMHG
TRICUSPID ANNULAR PLANE SYSTOLIC EXCURSION: 2.14 CM
TRIGL SERPL-MCNC: 136 MG/DL (ref 37–140)
TROPONIN I SERPL-MCNC: <0.01 NG/ML (ref 0–0.04)
TV REST PULMONARY ARTERY PRESSURE: 32 MMHG
UROBILINOGEN UR STRIP-ACNC: 0.2 MG/DL
VLDLC SERPL CALC-MCNC: 27 MG/DL
WBC # SPEC AUTO: 5.5 X10(3)/MCL (ref 4.5–11.5)
WBC #/AREA URNS AUTO: NORMAL /HPF

## 2022-11-19 PROCEDURE — 80307 DRUG TEST PRSMV CHEM ANLYZR: CPT | Performed by: EMERGENCY MEDICINE

## 2022-11-19 PROCEDURE — 25000003 PHARM REV CODE 250: Performed by: INTERNAL MEDICINE

## 2022-11-19 PROCEDURE — G0378 HOSPITAL OBSERVATION PER HR: HCPCS

## 2022-11-19 PROCEDURE — 99285 EMERGENCY DEPT VISIT HI MDM: CPT | Mod: 25

## 2022-11-19 PROCEDURE — 93010 ELECTROCARDIOGRAM REPORT: CPT | Mod: ,,, | Performed by: INTERNAL MEDICINE

## 2022-11-19 PROCEDURE — 93010 EKG 12-LEAD: ICD-10-PCS | Mod: ,,, | Performed by: INTERNAL MEDICINE

## 2022-11-19 PROCEDURE — 96361 HYDRATE IV INFUSION ADD-ON: CPT

## 2022-11-19 PROCEDURE — 96365 THER/PROPH/DIAG IV INF INIT: CPT

## 2022-11-19 PROCEDURE — 96366 THER/PROPH/DIAG IV INF ADDON: CPT

## 2022-11-19 PROCEDURE — 81001 URINALYSIS AUTO W/SCOPE: CPT | Performed by: EMERGENCY MEDICINE

## 2022-11-19 PROCEDURE — 82077 ASSAY SPEC XCP UR&BREATH IA: CPT | Performed by: EMERGENCY MEDICINE

## 2022-11-19 PROCEDURE — 63600175 PHARM REV CODE 636 W HCPCS: Performed by: PHYSICIAN ASSISTANT

## 2022-11-19 PROCEDURE — 96375 TX/PRO/DX INJ NEW DRUG ADDON: CPT | Mod: 59

## 2022-11-19 PROCEDURE — 80061 LIPID PANEL: CPT | Performed by: PHYSICIAN ASSISTANT

## 2022-11-19 PROCEDURE — 84484 ASSAY OF TROPONIN QUANT: CPT | Performed by: EMERGENCY MEDICINE

## 2022-11-19 PROCEDURE — 95819 EEG AWAKE AND ASLEEP: CPT

## 2022-11-19 PROCEDURE — 81003 URINALYSIS AUTO W/O SCOPE: CPT | Performed by: EMERGENCY MEDICINE

## 2022-11-19 PROCEDURE — 93005 ELECTROCARDIOGRAM TRACING: CPT

## 2022-11-19 PROCEDURE — 63600175 PHARM REV CODE 636 W HCPCS: Performed by: EMERGENCY MEDICINE

## 2022-11-19 PROCEDURE — 85025 COMPLETE CBC W/AUTO DIFF WBC: CPT | Performed by: EMERGENCY MEDICINE

## 2022-11-19 PROCEDURE — 80053 COMPREHEN METABOLIC PANEL: CPT | Performed by: EMERGENCY MEDICINE

## 2022-11-19 RX ORDER — ONDANSETRON 2 MG/ML
4 INJECTION INTRAMUSCULAR; INTRAVENOUS EVERY 8 HOURS PRN
Status: DISCONTINUED | OUTPATIENT
Start: 2022-11-19 | End: 2022-11-20 | Stop reason: HOSPADM

## 2022-11-19 RX ORDER — ACETAMINOPHEN 325 MG/1
650 TABLET ORAL EVERY 4 HOURS PRN
Status: DISCONTINUED | OUTPATIENT
Start: 2022-11-19 | End: 2022-11-20 | Stop reason: HOSPADM

## 2022-11-19 RX ORDER — SODIUM CHLORIDE 9 MG/ML
INJECTION, SOLUTION INTRAVENOUS CONTINUOUS
Status: DISCONTINUED | OUTPATIENT
Start: 2022-11-19 | End: 2022-11-20

## 2022-11-19 RX ORDER — GLUCAGON 1 MG
1 KIT INJECTION
Status: DISCONTINUED | OUTPATIENT
Start: 2022-11-19 | End: 2022-11-20 | Stop reason: HOSPADM

## 2022-11-19 RX ORDER — LORAZEPAM 2 MG/ML
2 INJECTION INTRAMUSCULAR
Status: COMPLETED | OUTPATIENT
Start: 2022-11-19 | End: 2022-11-19

## 2022-11-19 RX ORDER — LORAZEPAM 2 MG/ML
2 INJECTION INTRAMUSCULAR EVERY 6 HOURS PRN
Status: DISCONTINUED | OUTPATIENT
Start: 2022-11-19 | End: 2022-11-20 | Stop reason: HOSPADM

## 2022-11-19 RX ORDER — LEVETIRACETAM 10 MG/ML
1000 INJECTION INTRAVASCULAR
Status: COMPLETED | OUTPATIENT
Start: 2022-11-19 | End: 2022-11-19

## 2022-11-19 RX ORDER — LEVETIRACETAM 10 MG/ML
1000 INJECTION INTRAVASCULAR EVERY 12 HOURS
Status: DISCONTINUED | OUTPATIENT
Start: 2022-11-19 | End: 2022-11-20

## 2022-11-19 RX ORDER — IBUPROFEN 200 MG
24 TABLET ORAL
Status: DISCONTINUED | OUTPATIENT
Start: 2022-11-19 | End: 2022-11-20 | Stop reason: HOSPADM

## 2022-11-19 RX ORDER — ZIPRASIDONE MESYLATE 20 MG/ML
20 INJECTION, POWDER, LYOPHILIZED, FOR SOLUTION INTRAMUSCULAR
Status: DISCONTINUED | OUTPATIENT
Start: 2022-11-19 | End: 2022-11-19

## 2022-11-19 RX ORDER — ACETAMINOPHEN 325 MG/1
650 TABLET ORAL EVERY 8 HOURS PRN
Status: DISCONTINUED | OUTPATIENT
Start: 2022-11-19 | End: 2022-11-20 | Stop reason: HOSPADM

## 2022-11-19 RX ORDER — IBUPROFEN 200 MG
16 TABLET ORAL
Status: DISCONTINUED | OUTPATIENT
Start: 2022-11-19 | End: 2022-11-20 | Stop reason: HOSPADM

## 2022-11-19 RX ADMIN — LEVETIRACETAM 1000 MG: 10 INJECTION INTRAVENOUS at 03:11

## 2022-11-19 RX ADMIN — SODIUM CHLORIDE: 9 INJECTION, SOLUTION INTRAVENOUS at 01:11

## 2022-11-19 RX ADMIN — LORAZEPAM 2 MG: 2 INJECTION INTRAMUSCULAR; INTRAVENOUS at 03:11

## 2022-11-19 NOTE — Clinical Note
Diagnosis: Witnessed seizure-like activity [[id number]]   Future Attending Provider: MARIA DOLORES DIAMOND [[id number]]   Admitting Provider:: MARIA DOLORES DIAMOND [[id number]]

## 2022-11-19 NOTE — ED NOTES
"Pt arrived to ED extremely agitated- aasi states drinking tonight at [employer] and pt states she catches seizures when she drinks. Was dx with seizure d/o 2.5 years ago; however, noncompliant c keppra. Pt had appx 12 episodes of syncope in route. 5-7 episodes while nurse monitoring pt. Seizure is focal. Pt eyes will roll back and pt will fall back with LOC appx 10-15 seconds and wake up stating "Im going home." Ddr. Dilcia to bedside. Ativan given to assist with recurrent seizures. Keppra IV given. IV start. Urine collected. Blood collected. Pt connected to monitors with seizure precaution in place with bed in line of sight to nurse dt behavior /condition.   "

## 2022-11-19 NOTE — H&P
[facility] Hospital Medicine History & Physical Examination       Patient Name: Emy Pierce  MRN: [id number]  Patient Class: Emergency   Admission Date: 11/19/2022   Admitting Physician:Jorge L Segundo MD  Length of Stay: 0  Attending Physician: Jorge L Segundo MD  Primary Care Provider: Steffen Puckett MD  Face-to-Face encounter date: 11/19/2022  Code Status:Full code   Chief Complaint: Loss of Consciousness (Hx seizure, +etoh, aasi reports multiple episodes of syncope tonight and while en route, aaox4 at current)        Patient information was obtained from patient, patient's family, past medical records and ER records.     HISTORY OF PRESENT ILLNESS:   Emy Pierce is a 57 y.o. female with a past medical history of seizure disorder, ADHD, anxiety, and obsessive-compulsive disorder presented to [facility] on 11/19/2022 for seizure versus syncopal episodes.  Patient was found on the ground at home by her family.  EMS reported multiple syncopal versus seizure episodes in route to ER.  Patient reports history of  seizure disorder and noncompliance with Keppra.  Patient states she only has seizures when she drinks alcohol. On exam patient complains of back pain. Patient denies chest pain, shortness of breath, abdominal pain, nausea, vomiting, and headache.   Initial vital signs in ED were /87, pulse 88, respirations 18, temperature 36.7° C, and SpO2 99%.  Labs revealed sodium 148, chloride 112, BUN 9.4, creatinine 0.85, glucose 102, undetectable troponin, and alcohol 215.  UDS was positive for cocaine and marijuana.  CT head revealed no acute intracranial process.  Patient had 4 episodes of witnessed loss of consciousness with mild convulsive episodes and was given 2 mg Ativan IV and IV Keppra 1 g in ED. Patient was admitted to hospital medicine service for further medical management.   PAST MEDICAL HISTORY:     Past Medical History:   Diagnosis Date    ADHD (attention  deficit hyperactivity disorder)     Anxiety     History of psychiatric care     tried ativan, Prozac, an adhd medication    History of psychiatric hospitalization     in the 1994- for anxiety/OCD    Obsessive-compulsive disorder     Psychiatric problem     Therapy     in 2000       PAST SURGICAL HISTORY:   Reviewed and negative     ALLERGIES:   Pcn [penicillins]    FAMILY HISTORY:   Reviewed and negative    SOCIAL HISTORY:   Occasional alcohol, cocaine, and marijuana use   Denies tobacco use      HOME MEDICATIONS:     Prior to Admission medications    Medication Sig Start Date End Date Taking? Authorizing Provider   hydrOXYzine (VISTARIL) 25 MG Cap Take 1 capsule (25 mg total) by mouth 4 (four) times daily. 7/22/13   Nelson Jaimes MD       REVIEW OF SYSTEMS:   Except as documented, all other systems reviewed and negative     PHYSICAL EXAM:     VITAL SIGNS: 24 HRS MIN & MAX LAST   Temp  Min: 98 °F (36.7 °C)  Max: 98 °F (36.7 °C) 98 °F (36.7 °C)   BP  Min: 126/68  Max: 148/87 126/68   Pulse  Min: 61  Max: 88  62   Resp  Min: 16  Max: 18 16   SpO2  Min: 98 %  Max: 99 % 98 %       General appearance: Female in no apparent distress.  HEENNT: Atraumatic head.   Lungs: Clear to auscultation bilaterally.   Heart: Regular rate and rhythm.    Abdomen: Soft, non-distended, non-tender. Bowel sounds are normal.   Extremities: No cyanosis, clubbing, or edema. No deformities.   Skin: No Rash. Warm and dry.   Neuro: Awake, drowsy. Responds appropriately to questions.   Psych/mental status: Appropriate mood and affect.   LABS AND IMAGING:     Recent Labs   Lab 11/19/22  0420   WBC 5.5   RBC 4.37   HGB 12.7   HCT 39.4   MCV 90.2   MCH 29.1   MCHC 32.2*   RDW 14.0      MPV 11.1*       Recent Labs   Lab 11/19/22  0420   *   K 3.8   CO2 23   BUN 9.4*   CREATININE 0.85   CALCIUM 9.3   ALBUMIN 4.0   ALKPHOS 69   ALT 11   AST 15   BILITOT 0.3       Microbiology Results (last 7 days)       ** No results found for the  last 168 hours. **             X-Ray Knee 1 or 2 View Right  INDICATIONS:    Knee pain    RIGHT KNEE, 2 VIEWS;    AP and lateral views were obtained.    2 metallic staples are in place embedded in the proximal lateral tibia 2 cm  inferior to the articular surface indicating a previous lateral collateral  ligament repair.    Positive for significant joint space narrowing, marginal osteophyte formation,  subchondral sclerosis and remodeling of the articulating surfaces typical for  OA of the 3 compartments.    Negative for fracture or lytic lesion.  Normal for alignment and mineralization.  No evidence of joint space effusion.    Impression: Moderate OA of the knee.  And postsurgical changes.      Electronically Signed By: Mateo Lackey MD  Date/Time Signed: 02/26/2021 11:48        ASSESSMENT & PLAN:   Assessment:  Seizure disorder with breakthrough seizures  Possible syncope  Hypernatremia  Polysubstance abuse    Plan:  Neurology consulted, appreciate recommendations   IV Keppra 1000 mg BID  IV Ativan PRN  EEG  Seizure precautions  Echo  Bilateral carotid ultrasound   Lipid panel   Hemoglobin A1c   Orthostatic vital signs   Telemetry monitoring   Fall precautions  Continue approprate home medications   Labs in AM         VTE Prophylaxis: will be placed on SCD for DVT prophylaxis and will be advised to be as mobile as possible and sit in a chair as tolerated      __________________________________________________________________________  INPATIENT LIST OF MEDICATIONS     Scheduled Meds:   levetiracetam IV  1,000 mg Intravenous Q12H     Continuous Infusions:  PRN Meds:.lorazepam      BRENNON, Rolando Baker PA-C, have reviewed and discussed the case with Dr. Jorge L Segundo MD  Please see the following addendum for further assessment and plan from there attending MD.    11/19/2022    ________________________________________________________________________________    MD Addendum:  Dr. Jorge L WILLETT MD  assumed care of this patient today at 11 am  For the patient encounter, I performed the substantive portion of the visit, I reviewed the PA documentation, treatment plan, and medical decision making.  I had face to face time with this patient     A. History:  Patient came in with syncope vs seizures. Was found down at home  She does have h/o seizures. Not on any meds  H/O illict drug use   Now awake and oriented, denies any SOB, chest pain, fever or chills    B. Physical exam:  Heart RRR  Lungs clear   Abdomen soft and non tender   No FND     C. Medical decision making:  Patients labs and vitals reviewed  She is now on iv keppra   Cont iv fluids for 24 hrs   Continue strict aspiration, fall and decubitus precautions    F/U on neuro recommendations     Discharge Planning and Disposition: No mobility needs. Ambulating well. Good social support system.   Anticipated discharge    All diagnosis and differential diagnosis have been reviewed; assessment and plan has been documented; I have personally reviewed the labs and test results that are presently available; I have reviewed the patients medication list; I have reviewed the consulting providers response and recommendations. I have reviewed or attempted to review medical records based upon their availability.    All of the patient and family questions have been addressed and answered. Patient's is agreeable to the above stated plan. I will continue to monitor closely and make adjustments to medical management as needed.      Jorge L Segundo MD  11/19/2022

## 2022-11-19 NOTE — ED PROVIDER NOTES
Encounter Date: 11/19/2022       History     Chief Complaint   Patient presents with    Loss of Consciousness     Hx seizure, +etoh, aasi reports multiple episodes of syncope tonight and while en route, aaox4 at current     56 yo F with reported hx seizures dx 2 years ago in [place] but nonadherent to prescribed Keppra due to fear that taking the medication would cause more seizures to happen, presents to the ED for evaluation of altered mental status - reportedly found on the ground at home by her family   She admits to ETOH and THC tonight - denies that she is having syncopal episodes or seizures but is disoriented at this time. 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 The history is limited by the condition of the patient.   Loss of Consciousness  This is a recurrent problem. Episode onset: unknown. The problem occurs constantly. The problem has been gradually worsening. Pertinent negatives include no chest pain, no abdominal pain, no headaches and no shortness of breath. Nothing aggravates the symptoms. Nothing relieves the symptoms.   Review of patient's allergies indicates:   Allergen Reactions    Pcn [penicillins]      unknown     Past Medical History:   Diagnosis Date    ADHD (attention deficit hyperactivity disorder)     Anxiety     History of psychiatric care     tried ativan, Prozac, an adhd medication    History of psychiatric hospitalization     in the 1994- for anxiety/OCD    Obsessive-compulsive disorder     Psychiatric problem     Therapy     in 2000     No past surgical history on file.  Family History   Problem Relation Age of Onset    Breast cancer Mother 48    Heart disease Mother 58        MI    Diabetes Maternal Aunt     Heart disease Maternal Grandmother     ADD / ADHD Son     Anxiety disorder Son     ADD / ADHD Son      Social History     Tobacco Use    Smoking status:  Former     Types: Cigarettes     Quit date: 2013     Years since quittin.3   Substance Use Topics    Alcohol use: Yes     Alcohol/week: 2.0 standard drinks     Types: 2 Glasses of wine per week     Comment: drinks occassionally     Drug use: No     Comment: remote THC use in college; none currently/recently     Review of Systems   Unable to perform ROS: Mental status change   Respiratory:  Negative for shortness of breath.    Cardiovascular:  Positive for syncope. Negative for chest pain.   Gastrointestinal:  Negative for abdominal pain.   Neurological:  Negative for headaches.     Physical Exam     Initial Vitals [22 0220]   BP Pulse Resp Temp SpO2   (!) 148/87 88 18 98 °F (36.7 °C) 99 %      MAP       --         Physical Exam    Nursing note and vitals reviewed.  Constitutional: She appears well-developed and well-nourished. No distress.   HENT:   Head: Normocephalic and atraumatic.   Mouth/Throat: Oropharynx is clear and moist.   Eyes: Conjunctivae are normal. Pupils are equal, round, and reactive to light.   Neck: Neck supple.   Normal range of motion.  Cardiovascular:  Normal rate, regular rhythm and normal heart sounds.           No murmur heard.  Pulmonary/Chest: Breath sounds normal. No respiratory distress.   Abdominal: Abdomen is soft. She exhibits no distension. There is no abdominal tenderness.   Musculoskeletal:         General: Normal range of motion.      Cervical back: Normal range of motion and neck supple.     Neurological: She is alert. She has normal strength. No sensory deficit. GCS eye subscore is 4. GCS verbal subscore is 5. GCS motor subscore is 6.   Oriented to self and location, off on situation  Moves all extremities with symmetric strength   Skin: Skin is warm and dry. No rash noted.   Psychiatric: She has a normal mood and affect.       ED Course   Procedures  Labs Reviewed   COMPREHENSIVE METABOLIC PANEL - Abnormal; Notable for the following components:       Result Value     Sodium Level 148 (*)     Chloride 112 (*)     Glucose Level 102 (*)     Blood Urea Nitrogen 9.4 (*)     All other components within normal limits   ALCOHOL,MEDICAL (ETHANOL) - Abnormal; Notable for the following components:    Ethanol Level 215.0 (*)     All other components within normal limits   CBC WITH DIFFERENTIAL - Abnormal; Notable for the following components:    MCHC 32.2 (*)     MPV 11.1 (*)     Neut # 2.0 (*)     All other components within normal limits   DRUG SCREEN, URINE (BEAKER) - Abnormal; Notable for the following components:    Cannabinoids, Urine Positive (*)     Cocaine, Urine Positive (*)     All other components within normal limits    Narrative:     Cut off concentrations:    Amphetamines - 1000 ng/ml  Barbiturates - 200 ng/ml  Benzodiazepine - 200 ng/ml  Cannabinoids (THC) - 50 ng/ml  Cocaine - 300 ng/ml  Fentanyl - 1.0 ng/ml  MDMA - 500 ng/ml  Opiates - 300 ng/ml   Phencyclidine (PCP) - 25 ng/ml    Specimen submitted for drug analysis and tested for pH and specific gravity in order to evaluate sample integrity. Suspect tampering if specific gravity is <1.003 and/or pH is not within the range of 4.5 - 8.0  False negatives may result form substances such as bleach added to urine.  False positives may result for the presence of a substance with similar chemical structure to the drug or its metabolite.    This test provides only a PRELIMINARY analytical test result. A more specific alternate chemical method must be used in order to obtain a confirmed analytical result. Gas chromatography/mass spectrometry (GC/MS) is the preferred confirmatory method. Other chemical confirmation methods are available. Clinical consideration and professional judgement should be applied to any drug of abuse test result, particularly when preliminary positive results are used.    Positive results will be confirmed only at the physicians request. Unconfirmed screening results are to be used only for medical  purposes (treatment).        URINALYSIS, REFLEX TO URINE CULTURE - Abnormal; Notable for the following components:    Blood, UA Trace (*)     All other components within normal limits   TROPONIN I - Normal   URINALYSIS, MICROSCOPIC - Normal   CBC W/ AUTO DIFFERENTIAL    Narrative:     The following orders were created for panel order CBC auto differential.  Procedure                               Abnormality         Status                     ---------                               -----------         ------                     CBC with Differential[[id number]]         Abnormal            Final result                 Please view results for these tests on the individual orders.   LIPID PANEL     EKG Readings: (Independently Interpreted)   Initial Reading: No STEMI. Rhythm: Normal Sinus Rhythm. Heart Rate: 62. Ectopy: No Ectopy. Conduction: Normal. T Waves: Normal. Axis: Normal.   11/19/2022 @ 0326     Imaging Results              CT Head Without Contrast (In process)  Result time 11/19/22 08:00:28                  X-Rays:   Other Radiology Reports: CT head w/o IV contrast:   Technique:  CT of the head was performed without intravenous contrast with axial as well as coronal and sagittal images.     Comparison:  None.     Dosage Information:  Automated Exposure Control was utilized 898.71 mGy.cm.     Clinical history:  Loss of Consciousness (Hx seizure, +etoh, aasi reports multiple episodes of syncope tonight and while en route, aaox4 at current).     Findings:  Hemorrhage:  No acute intracranial hemorrhage is seen.  CSF spaces:  The ventricles sulci and basal cisterns are within normal limits for age.  Brain parenchyma:  Unremarkable with preservation of the grey white junction throughout.  Cerebellum:  Unremarkable.  Vascular:  Mild atheromatous calcification of the intracranial arteries is seen.  Sella and skull base:  The sella appears to be within normal limits for age.  Cerebellopontine angles:  Within normal  limits.  Herniation:  None.  Intracranial calcifications:  Incidental note is made of bilateral choroid plexus calcification. Incidental note is made of some pineal region calcification.  Calvarium:  No acute linear or depressed skull fracture is seen.     Maxillofacial Structures:  Paranasal sinuses:  The visualized paranasal sinuses appear clear with no mucoperiosteal thickening or air fluid levels identified.  Orbits:  The orbits appear unremarkable.  Zygomatic arches:  The zygomatic arches are intact and unremarkable.  Temporal bones and mastoids:  The temporal bones and mastoids appear unremarkable.  TMJ:  The mandibular condyles appear normally placed with respect to the mandibular fossa.     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        Impression:  1. No acute intracranial process identified. Details and other findings as noted above.     Medications   levETIRAcetam in NaCl (iso-os) IVPB 1,000 mg (has no administration in time range)   LORazepam injection 2 mg (has no administration in time range)   LORazepam injection 2 mg (2 mg Intravenous Given 11/19/22 0315)   levETIRAcetam in NaCl (iso-os) IVPB 1,000 mg (0 mg Intravenous Stopped 11/19/22 0345)     Medical Decision Making:   Initial Assessment:   Ms. Pierce was brought in for evaluation of AMS, multiple episodes of brief LOC. Subtle convulsions noted on at least 2 episodes shortly after arrival. Reports dx in past w/ seizures but does not take the keppra prescribed. As she was found on the floor and admits to substance use tonight as well as family reports of increased seizure/syncope frequency, will obtain CT head, labs, EKG for evaluation.   Differential Diagnosis:   Syncope, seizure, ICH, occult trauma, brain mass, electrolyte derangements, substance use/abuse/dependence  Independently Interpreted Test(s):   I have ordered and independently interpreted EKG  Reading(s) - see prior notes  Clinical Tests:   Lab Tests: Reviewed and Ordered  The following lab test(s) were unremarkable: CBC and CMP       <> Summary of Lab: + UDS  Radiological Study: Ordered and Reviewed  Medical Tests: Reviewed and Ordered  ED Management:  ED work up w/ + UDS (cocaine and THC) as well as elevated alcohol level. Otherwise labs, imaging, EKG unremarkable. She did have a slightly longer episode while in the ED where her head turned left, eyes rolled back, she collapsed backward on the stretcher and had brief rhythmic jerking of the head and upper extremities. She was given IV ativan at that time along with IV keppra load and has had no further episodes in my observation. She will be admitted to further inpt work up/observation to ensure seizure episodes abating with resuming prescribed medications. Consider further neuro evaluation if not returning to baseline once enough time has passed to be sober from her recent ingestions.            ED Course as of 11/19/22 0801   Sat Nov 19, 2022   0312 Patient has had 4 episodes of witnessed loss of consciousness while in the ED - mild convulsive episodes as well. Reported hx of seizure and non-adherent to medications w/ concern that it might cause further seizures per patient report. IV ativan and keppra ordered.  [KS]   0339 Patient now sleeping post medication administration. Awaiting labs and CT at this time. Will plan for admission for multiple sequential seizures.  [KS]   0558 Labs w/ elevated alcohol level, mild hypernatremia. CT head w/o acute findings. Given suspected multiple seizures today, will admit for further observation, continue Keppra. Would benefit from additional education regarding seizures, medications prior to discharge (once sober).  [KS]      ED Course User Index  [KS] Tomasa Ha MD                 Clinical Impression:   Final diagnoses:  [R55] Syncope  [R56.9] Witnessed seizure-like activity (Primary)  [R40.20] Loss of  consciousness  [F10.920] Acute alcoholic intoxication without complication  [F19.90] Substance use        ED Disposition Condition    Observation                 Tomasa Ha MD  11/19/22 1024

## 2022-11-20 VITALS
HEART RATE: 72 BPM | WEIGHT: 157.88 LBS | OXYGEN SATURATION: 97 % | TEMPERATURE: 98 F | SYSTOLIC BLOOD PRESSURE: 136 MMHG | BODY MASS INDEX: 24.78 KG/M2 | RESPIRATION RATE: 18 BRPM | DIASTOLIC BLOOD PRESSURE: 68 MMHG | HEIGHT: 67 IN

## 2022-11-20 LAB
ALBUMIN SERPL-MCNC: 3.3 GM/DL (ref 3.5–5)
ALBUMIN/GLOB SERPL: 1.4 RATIO (ref 1.1–2)
ALP SERPL-CCNC: 60 UNIT/L (ref 40–150)
ALT SERPL-CCNC: 8 UNIT/L (ref 0–55)
AST SERPL-CCNC: 15 UNIT/L (ref 5–34)
BASOPHILS # BLD AUTO: 0.03 X10(3)/MCL (ref 0–0.2)
BASOPHILS NFR BLD AUTO: 0.6 %
BILIRUBIN DIRECT+TOT PNL SERPL-MCNC: 0.5 MG/DL
BUN SERPL-MCNC: 7.4 MG/DL (ref 9.8–20.1)
CALCIUM SERPL-MCNC: 8.8 MG/DL (ref 8.4–10.2)
CHLORIDE SERPL-SCNC: 112 MMOL/L (ref 98–107)
CO2 SERPL-SCNC: 20 MMOL/L (ref 22–29)
CREAT SERPL-MCNC: 0.78 MG/DL (ref 0.55–1.02)
EOSINOPHIL # BLD AUTO: 0.1 X10(3)/MCL (ref 0–0.9)
EOSINOPHIL NFR BLD AUTO: 1.9 %
ERYTHROCYTE [DISTWIDTH] IN BLOOD BY AUTOMATED COUNT: 13.6 % (ref 11.5–17)
EST. AVERAGE GLUCOSE BLD GHB EST-MCNC: 116.9 MG/DL
GFR SERPLBLD CREATININE-BSD FMLA CKD-EPI: >60 MLS/MIN/1.73/M2
GLOBULIN SER-MCNC: 2.4 GM/DL (ref 2.4–3.5)
GLUCOSE SERPL-MCNC: 90 MG/DL (ref 74–100)
HBA1C MFR BLD: 5.7 %
HCT VFR BLD AUTO: 35.2 % (ref 37–47)
HGB BLD-MCNC: 11.5 GM/DL (ref 12–16)
IMM GRANULOCYTES # BLD AUTO: 0 X10(3)/MCL (ref 0–0.04)
IMM GRANULOCYTES NFR BLD AUTO: 0 %
LYMPHOCYTES # BLD AUTO: 2.75 X10(3)/MCL (ref 0.6–4.6)
LYMPHOCYTES NFR BLD AUTO: 53.3 %
MCH RBC QN AUTO: 29.2 PG (ref 27–31)
MCHC RBC AUTO-ENTMCNC: 32.7 MG/DL (ref 33–36)
MCV RBC AUTO: 89.3 FL (ref 80–94)
MONOCYTES # BLD AUTO: 0.4 X10(3)/MCL (ref 0.1–1.3)
MONOCYTES NFR BLD AUTO: 7.8 %
NEUTROPHILS # BLD AUTO: 1.9 X10(3)/MCL (ref 2.1–9.2)
NEUTROPHILS NFR BLD AUTO: 36.4 %
NRBC BLD AUTO-RTO: 0 %
PLATELET # BLD AUTO: 156 X10(3)/MCL (ref 130–400)
PMV BLD AUTO: 11.2 FL (ref 7.4–10.4)
POTASSIUM SERPL-SCNC: 3.9 MMOL/L (ref 3.5–5.1)
PROT SERPL-MCNC: 5.7 GM/DL (ref 6.4–8.3)
RBC # BLD AUTO: 3.94 X10(6)/MCL (ref 4.2–5.4)
SODIUM SERPL-SCNC: 141 MMOL/L (ref 136–145)
WBC # SPEC AUTO: 5.2 X10(3)/MCL (ref 4.5–11.5)

## 2022-11-20 PROCEDURE — 83036 HEMOGLOBIN GLYCOSYLATED A1C: CPT | Performed by: PHYSICIAN ASSISTANT

## 2022-11-20 PROCEDURE — A9577 INJ MULTIHANCE: HCPCS | Performed by: INTERNAL MEDICINE

## 2022-11-20 PROCEDURE — 96366 THER/PROPH/DIAG IV INF ADDON: CPT

## 2022-11-20 PROCEDURE — 25500020 PHARM REV CODE 255: Performed by: INTERNAL MEDICINE

## 2022-11-20 PROCEDURE — G0378 HOSPITAL OBSERVATION PER HR: HCPCS

## 2022-11-20 PROCEDURE — 63600175 PHARM REV CODE 636 W HCPCS: Performed by: PHYSICIAN ASSISTANT

## 2022-11-20 PROCEDURE — 85025 COMPLETE CBC W/AUTO DIFF WBC: CPT | Performed by: PHYSICIAN ASSISTANT

## 2022-11-20 PROCEDURE — 36415 COLL VENOUS BLD VENIPUNCTURE: CPT | Performed by: PHYSICIAN ASSISTANT

## 2022-11-20 PROCEDURE — 80053 COMPREHEN METABOLIC PANEL: CPT | Performed by: PHYSICIAN ASSISTANT

## 2022-11-20 PROCEDURE — 96361 HYDRATE IV INFUSION ADD-ON: CPT

## 2022-11-20 RX ORDER — LEVETIRACETAM 500 MG/1
1000 TABLET ORAL 2 TIMES DAILY
Status: DISCONTINUED | OUTPATIENT
Start: 2022-11-20 | End: 2022-11-20 | Stop reason: HOSPADM

## 2022-11-20 RX ORDER — LEVETIRACETAM 1000 MG/1
1000 TABLET ORAL 2 TIMES DAILY
Qty: 60 TABLET | Refills: 1 | Status: SHIPPED | OUTPATIENT
Start: 2022-11-20 | End: 2023-06-08 | Stop reason: SDUPTHER

## 2022-11-20 RX ADMIN — LEVETIRACETAM 1000 MG: 10 INJECTION INTRAVENOUS at 08:11

## 2022-11-20 RX ADMIN — GADOBENATE DIMEGLUMINE 15 ML: 529 INJECTION, SOLUTION INTRAVENOUS at 03:11

## 2022-11-20 NOTE — PROCEDURES
EEG REPORT         DATE OF THE STUDY:    11/19/2022      REFERRING PROVIDER:   Dr Jayce Gomez      REASON FOR THE STUDY:    Sz      CONDITION OF THE RECORDING:    This is an 19 channel EEG utilizing the 10-20 International System for electrode placement including (18) cephalic and (1) EKG and standard montages.  All impedance were verified and noted to be within standards.  The recording was done for a period of 24 minutes.      DESCRIPTION:    The EEG in the last day contains a well-developed approximately symmetric 9 Hz posterior dominant activity rhythm.  Drowsiness was characterized by mild generalized slowing.  Stage II sleep did not occur.  Photic stimulation produced no abnormalities.  Definite focal or epileptiform abnormalities were not recorded.        IMPRESSION:   Normal EEG with the patient recorded in the alert and drowsy states.                  Jayce oGmez MD

## 2022-11-20 NOTE — DISCHARGE SUMMARY
[facility] Hospital Medicine Discharge Summary    Admit Date: 11/19/2022  Discharge Date and Time: 11/20/20225:58 PM  Admitting Physician:  Team  Discharging Physician: Jorge L Segundo MD.  Primary Care Physician: Steffen Puckett MD  Consults: Neurology    Discharge Diagnoses:  Seizure disorder with breakthrough seizures  Possible syncope  Hypernatremia  Polysubstance abuse    Hospital Course:   Emy Pierce is a 57 y.o. female with a past medical history of seizure disorder, ADHD, anxiety, and obsessive-compulsive disorder presented to [facility] on 11/19/2022 for seizure versus syncopal episodes.  Patient was found on the ground at home by her family.  EMS reported multiple syncopal versus seizure episodes in route to ER.  Patient reports history of  seizure disorder and noncompliance with Keppra.  Patient states she only has seizures when she drinks alcohol. On exam patient complains of back pain. Patient denies chest pain, shortness of breath, abdominal pain, nausea, vomiting, and headache.   Initial vital signs in ED were /87, pulse 88, respirations 18, temperature 36.7° C, and SpO2 99%.  Labs revealed sodium 148, chloride 112, BUN 9.4, creatinine 0.85, glucose 102, undetectable troponin, and alcohol 215.  UDS was positive for cocaine and marijuana.  CT head revealed no acute intracranial process.  Patient had 4 episodes of witnessed loss of consciousness with mild convulsive episodes and was given 2 mg Ativan IV and IV Keppra 1 g in ED. Patient was admitted to hospital medicine service for further medical management    She was started on iv keppra and neuro team was consulted, EEG done and was unremarkable. Neuro team recommended to continue keppra and f/u in their clinic. She was advised not to drive for 6 months. Advised life style modification. She had no seizure for 24 hrs and was discharged home in a stable condition.     Pt was seen and examined on the day of  discharge  Vitals:  VITAL SIGNS: 24 HRS MIN & MAX LAST   Temp  Min: 98 °F (36.7 °C)  Max: 98.7 °F (37.1 °C) 98.2 °F (36.8 °C)   BP  Min: 126/65  Max: 137/77 136/68   Pulse  Min: 63  Max: 72  72   Resp  Min: 16  Max: 18 18   SpO2  Min: 97 %  Max: 100 % 97 %       Physical Exam:  Heart RRR  Lungs clear   Abdomen soft and non tender   Neuro: No FND      Procedures Performed: No admission procedures for hospital encounter.     Significant Diagnostic Studies: See Full reports for all details    Recent Labs   Lab 11/19/22 0420 11/20/22  0832   WBC 5.5 5.2   RBC 4.37 3.94*   HGB 12.7 11.5*   HCT 39.4 35.2*   MCV 90.2 89.3   MCH 29.1 29.2   MCHC 32.2* 32.7*   RDW 14.0 13.6    156   MPV 11.1* 11.2*       Recent Labs   Lab 11/19/22 0420 11/20/22  0832   * 141   K 3.8 3.9   CO2 23 20*   BUN 9.4* 7.4*   CREATININE 0.85 0.78   CALCIUM 9.3 8.8   ALBUMIN 4.0 3.3*   ALKPHOS 69 60   ALT 11 8   AST 15 15   BILITOT 0.3 0.5        Microbiology Results (last 7 days)       ** No results found for the last 168 hours. **             MRI Brain W WO Contrast  EXAMINATION  MRI BRAIN W WO CONTRAST    CLINICAL HISTORY  Seizure, new-onset, no history of trauma;    TECHNIQUE  Pre- and post-contrast multiplanar, multisequence MR images of the brain were obtained.    CONTRAST  IV: MultiHance, 15 mL    COMPARISON  *No prior MR evaluation is available for direct comparison.  *Non-contrast head CT dated 19 November 2022 was reviewed.    FINDINGS  Exam quality: adequate for evaluation    Brain parenchyma: No mass-like focal enhancement or otherwise suspicious post-contrast findings are appreciated. No restricted diffusion or other suggestion of acute ischemic insult. Scattered punctate periventricular and subcortical T2/FLAIR hyperintense white matter foci are appreciated through the bilateral cerebral hemispheres, without corresponding diffusion abnormality or post-contrast enhancement.  No findings to suggest intracranial hemorrhage.  No localized mass effect or midline shift. Differentiation of the gray-white border is grossly preserved.    CSF spaces: Normal in size and configuration of the ventricles. No abnormal extra-axial fluid. The basal cisterns are preserved. Sulcal volume appears normal for patient age.    Sella/Suprasellar regions: No abnormalities.    Other findings: Normal flow signal voids within the large intracranial vessels or dural sinuses. No focal abnormalities of the regional osseous structures and extracranial soft tissues. Mastoid air cells are well aerated. Paranasal sinuses are clear, with no fluid level.    IMPRESSION  1. No convincing acute or focal intracranial abnormality.  2. Scattered punctate white matter T2/FLAIR hyperintense foci could reflect early changes of chronic microvascular ischemic disease, but are otherwise nonspecific.    Electronically signed by: Joe Dunn  Date:    11/20/2022  Time:    15:47         Medication List        START taking these medications      levETIRAcetam 1000 MG tablet  Commonly known as: KEPPRA  Take 1 tablet (1,000 mg total) by mouth 2 (two) times daily.            STOP taking these medications      hydrOXYzine pamoate 25 MG Cap  Commonly known as: VISTARIL               Where to Get Your Medications        You can get these medications from any pharmacy    Bring a paper prescription for each of these medications  levETIRAcetam 1000 MG tablet          Explained in detail to the patient about the discharge plan, medications, and follow-up visits. Pt understands and agrees with the treatment plan  Discharge Disposition: Home or Self Care   Discharged Condition: stable  Diet-   Dietary Orders (From admission, onward)       Start     Ordered    11/19/22 1606  Diet Adult Regular  Diet effective now         11/19/22 1605                   Medications Per DC med rec  Activities as tolerated   Follow-up Information       Jayce Gomez MD Follow up in 1 month(s).    Specialty:  Neurology  Contact information:  [address] [place] [address]  [address]  [facility] [address]  [phone]                           For further questions contact hospitalist office    Discharge time 33 minutes    For worsening symptoms, chest pain, shortness of breath, increased abdominal pain, high grade fever, stroke or stroke like symptoms, immediately go to the nearest Emergency Room or call 911 as soon as possible.      Jorge L Pichardo M.D, on 11/20/2022. at 5:58 PM.          Abdomen soft, non-tender, no guarding.

## 2022-11-20 NOTE — CONSULTS
[facility] - 9th Floor Med Surg  Neurology  Consult Note    Patient Name: Emy Pierce  MRN: [id number]  Admission Date: 11/19/2022  Hospital Length of Stay: 0 days  Code Status: Full Code   Attending Provider: Jorge L Segundo MD   Consulting Provider: Jayce Gomez MD  Primary Care Physician: Steffen Puckett MD  Principal Problem:Witnessed seizure-like activity    Consults  Subjective:     Chief Complaint:  Seizure-like activity     HPI:  57 years old female with a history of ADHD and seizures.  She was brought to ED last night with acute onset of loss of consciousness.  She was at [employer] last night and had at least 3 alcoholic beverages.  She also admitted using THC.      When she walked to her car, she experienced a syncopal episode and then became disoriented and was brought to the ED.     On the way to the hospital, she had like 12 episodes of syncope (at least 5-7 episodes while the nurse was monitoring her). According to the record, her eyes would roll back and would fall backward with loss of consciousness for approximately 10 to 15 seconds.    When she arrived to the ED, she was extremely agitated. Ativan IV was given.    Her sodium was 148.  Chloride was 112.  BUN was 9.4.  Creatinine was 0.85.Urine drug screen was positive for cocaine and cannabinoids.    She reported having a seizure approximately 2 years ago while vacationing in [place].  At that time, she had been advised to take Keppra but she did not follow the recommendations.    She was started on Keppra 1 g twice daily.    Past Medical History:   Diagnosis Date    ADHD (attention deficit hyperactivity disorder)     Anxiety     History of psychiatric care     tried ativan, Prozac, an adhd medication    History of psychiatric hospitalization     in the 1994- for anxiety/OCD    Obsessive-compulsive disorder     Psychiatric problem     Therapy     in 2000       No past surgical history on file.    Review of patient's allergies  indicates:   Allergen Reactions    Pcn [penicillins]      unknown       Current Neurological Medications:   Continuous    Medication Ordered Dose/Rate, Route, Frequency Last Action   0.9%  NaCl infusion 100 mL/hr, IV, Continuous New Bag, 100 mL/hr at  1343     Scheduled    Medication Ordered Dose/Rate, Route, Frequency Last Action   levETIRAcetam in NaCl (iso-os) IVPB 1,000 mg 1,000 mg, IV, Q12H Stopped,  1556     PRN        No current facility-administered medications on file prior to encounter.     Current Outpatient Medications on File Prior to Encounter   Medication Sig    hydrOXYzine (VISTARIL) 25 MG Cap Take 1 capsule (25 mg total) by mouth 4 (four) times daily.      Family History       Problem Relation (Age of Onset)    ADD / ADHD Son, Son    Anxiety disorder Son    Breast cancer Mother (48)    Diabetes Maternal Aunt    Heart disease Mother (58), Maternal Grandmother          Tobacco Use    Smoking status: Former     Types: Cigarettes     Quit date: 2013     Years since quittin.3    Smokeless tobacco: Not on file   Substance and Sexual Activity    Alcohol use: Yes     Alcohol/week: 2.0 standard drinks     Types: 2 Glasses of wine per week     Comment: drinks occassionally     Drug use: No     Comment: remote THC use in college; none currently/recently    Sexual activity: Never     Partners: Male     Birth control/protection: None     Review of Systems  Objective:     Vital Signs (Most Recent):  Temp: 98 °F (36.7 °C) (22 0220)  Pulse: 68 (22 1702)  Resp: 16 (22 1702)  BP: 133/74 (22 1702)  SpO2: 100 % (22 1702) Vital Signs (24h Range):  Temp:  [98 °F (36.7 °C)] 98 °F (36.7 °C)  Pulse:  [60-88] 68  Resp:  [14-22] 16  SpO2:  [98 %-100 %] 100 %  BP: (108-149)/(57-87) 133/74        There is no height or weight on file to calculate BMI.    Physical Exam  Constitutional:       Appearance: Normal appearance.   HENT:      Head: Normocephalic and atraumatic.   Eyes:       Extraocular Movements: Extraocular movements intact.      Pupils: Pupils are equal, round, and reactive to light.   Pulmonary:      Effort: Pulmonary effort is normal.   Musculoskeletal:      Cervical back: Neck supple.   Skin:     General: Skin is warm.   Neurological:      Mental Status: She is alert and oriented to person, place, and time.      Cranial Nerves: Cranial nerves 2-12 are intact.      Motor: Motor function is intact.      Coordination: Coordination is intact.       NEUROLOGICAL EXAMINATION:     MENTAL STATUS   Oriented to person, place, and time.     CRANIAL NERVES   Cranial nerves II through XII intact.     CN III, IV, VI   Pupils are equal, round, and reactive to light.    Significant Labs: All pertinent lab results from the past 24 hours have been reviewed.    Significant Imaging: I have reviewed and interpreted all pertinent imaging results/findings within the past 24 hours.    CT HEAD WITHOUT CONTRAST     CLINICAL HISTORY:   Syncope, recurrent;     TECHNIQUE:   Multiple axial images were obtained from the base of the brain to the vertex without contrast administration.  Sagittal and coronal reconstructions were performed. .Automatic exposure control  (AEC) is utilized to reduce patient radiation exposure.     COMPARISON:   None     FINDINGS:   There is no intracranial mass or lesion seen.  No hemorrhage is seen.  No infarct is seen.  The ventricles and basilar cisterns appear normal.  Brain parenchyma appears grossly unremarkable.     Posterior fossa appears normal.  The calvarium is intact.  The paranasal sinuses appear grossly unremarkable.    Impression:       No acute abnormality seen        DATE OF THE STUDY:    11/19/2022        REFERRING PROVIDER:   Dr Jayce Gomez        REASON FOR THE STUDY:    Sz        CONDITION OF THE RECORDING:    This is an 19 channel EEG utilizing the 10-20 International System for electrode placement including (18) cephalic and (1) EKG and standard montages.  All  impedance were verified and noted to be within standards.  The recording was done for a period of 24 minutes.        DESCRIPTION:    The EEG in the last day contains a well-developed approximately symmetric 9 Hz posterior dominant activity rhythm.  Drowsiness was characterized by mild generalized slowing.  Stage II sleep did not occur.  Photic stimulation produced no abnormalities.  Definite focal or epileptiform abnormalities were not recorded.           IMPRESSION:   Normal EEG with the patient recorded in the alert and drowsy states.    Amphetamines, Urine Negative Negative    Barbituates, Urine Negative Negative    Benzodiazepine, Urine Negative Negative    Cannabinoids, Urine Negative Positive Abnormal     Cocaine, Urine Negative Positive Abnormal     Fentanyl, Urine Negative Negative    MDMA, Urine Negative Negative    Opiates, Urine Negative Negative    Phencyclidine, Urine Negative Negative      Ethanol Level <=10.0 mg/dL 215.0 High       Sodium Level 136 - 145 mmol/L 148 High   142.0 R  141.0 R  141.0 R  140    Potassium Level 3.5 - 5.1 mmol/L 3.8  4.5  4.6  4.6  4.7    Chloride 98 - 107 mmol/L 112 High   106.0 R  106.0 R  108.0 High  R  105 R    Carbon Dioxide 22 - 29 mmol/L 23  28.0 R  27.0 R  26.0 R  26 R    Glucose Level 74 - 100 mg/dL 102 High   104.0 R  96.0 R  113.0 High  R  89 R    Blood Urea Nitrogen 9.8 - 20.1 mg/dL 9.4 Low            Assessment and Plan:     Active Diagnoses:    Diagnosis Date Noted POA    PRINCIPAL PROBLEM:  Witnessed seizure-like activity [R56.9] 11/19/2022 Unknown      Problems Resolved During this Admission:       IMP  Breakthrough seizures - precipitated by hyponatremia, ETOH/ and cocaine.  Ethanol intoxication  215 mg/dl - resolved      VTE Risk Mitigation (From admission, onward)           Ordered     IP VTE LOW RISK PATIENT  Once         11/19/22 0822     Place sequential compression device  Until discontinued         11/19/22 0822                  PLAN  Cont Keppra 1 gm  BID  Cont observation- needs to be sz free for 24 hrs before d/c home  Sz precautions  Avoid ETOH  Drug counseling  No driving x 6 months (needs to remain sz free and be compliance with tx before resuming driving).  Pt instructed to notify the DMV.  I can f/u as OP in 4 weeks      Thank you for your consult. I will follow-up with patient. Please contact us if you have any additional questions.    Jayce Gomez MD  Neurology  [facility] - 9th Floor Med Surg

## 2022-11-23 LAB
LEFT CCA DIST DIAS: 19 CM/S
LEFT CCA DIST SYS: 68 CM/S
LEFT CCA PROX DIAS: 13 CM/S
LEFT CCA PROX SYS: 62 CM/S
LEFT ECA SYS: 48 CM/S
LEFT ICA DIST DIAS: 18 CM/S
LEFT ICA DIST SYS: 44 CM/S
LEFT ICA MID DIAS: 19 CM/S
LEFT ICA MID SYS: 58 CM/S
LEFT ICA PROX DIAS: 10 CM/S
LEFT ICA PROX SYS: 44 CM/S
LEFT VERTEBRAL SYS: 53 CM/S
OHS CV CAROTID RIGHT ICA EDV HIGHEST: 27
OHS CV CAROTID ULTRASOUND LEFT ICA/CCA RATIO: 0.85
OHS CV CAROTID ULTRASOUND RIGHT ICA/CCA RATIO: 1.4
OHS CV PV CAROTID LEFT HIGHEST CCA: 68
OHS CV PV CAROTID LEFT HIGHEST ICA: 58
OHS CV PV CAROTID RIGHT HIGHEST CCA: 73
OHS CV PV CAROTID RIGHT HIGHEST ICA: 81
OHS CV US CAROTID LEFT HIGHEST EDV: 19
RIGHT CCA DIST DIAS: 16 CM/S
RIGHT CCA DIST SYS: 58 CM/S
RIGHT CCA PROX DIAS: 18 CM/S
RIGHT CCA PROX SYS: 73 CM/S
RIGHT ECA SYS: 43 CM/S
RIGHT ICA DIST DIAS: 27 CM/S
RIGHT ICA DIST SYS: 77 CM/S
RIGHT ICA MID DIAS: 26 CM/S
RIGHT ICA MID SYS: 67 CM/S
RIGHT ICA PROX DIAS: 27 CM/S
RIGHT ICA PROX SYS: 81 CM/S
RIGHT VERTEBRAL DIAS: 13 CM/S
RIGHT VERTEBRAL SYS: 51 CM/S

## 2023-05-16 ENCOUNTER — HOSPITAL ENCOUNTER (EMERGENCY)
Facility: HOSPITAL | Age: 58
Discharge: HOME | End: 2023-05-16
Attending: INTERNAL MEDICINE
Payer: COMMERCIAL

## 2023-05-16 VITALS
HEART RATE: 66 BPM | HEIGHT: 66 IN | TEMPERATURE: 97 F | BODY MASS INDEX: 21.69 KG/M2 | WEIGHT: 135 LBS | OXYGEN SATURATION: 100 % | SYSTOLIC BLOOD PRESSURE: 169 MMHG | DIASTOLIC BLOOD PRESSURE: 85 MMHG | RESPIRATION RATE: 20 BRPM

## 2023-05-16 DIAGNOSIS — S20.211A CONTUSION OF RIGHT CHEST WALL, INITIAL ENCOUNTER: ICD-10-CM

## 2023-05-16 DIAGNOSIS — R07.81 RIB PAIN ON RIGHT SIDE: Primary | ICD-10-CM

## 2023-05-16 DIAGNOSIS — R06.02 SOB (SHORTNESS OF BREATH): ICD-10-CM
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
## 2023-05-16 LAB
ALBUMIN SERPL-MCNC: 4 G/DL (ref 3.5–5)
ALBUMIN/GLOB SERPL: 1.1 RATIO (ref 1.1–2)
ALP SERPL-CCNC: 65 UNIT/L (ref 40–150)
ALT SERPL-CCNC: 7 UNIT/L (ref 0–55)
AST SERPL-CCNC: 15 UNIT/L (ref 5–34)
BASOPHILS # BLD AUTO: 0.03 X10(3)/MCL
BASOPHILS NFR BLD AUTO: 0.5 %
BILIRUBIN DIRECT+TOT PNL SERPL-MCNC: 1.1 MG/DL
BNP BLD-MCNC: 23.7 PG/ML
BUN SERPL-MCNC: 13.3 MG/DL (ref 9.8–20.1)
CALCIUM SERPL-MCNC: 9.8 MG/DL (ref 8.4–10.2)
CHLORIDE SERPL-SCNC: 111 MMOL/L (ref 98–107)
CO2 SERPL-SCNC: 24 MMOL/L (ref 22–29)
CREAT SERPL-MCNC: 0.93 MG/DL (ref 0.55–1.02)
EOSINOPHIL # BLD AUTO: 0.14 X10(3)/MCL (ref 0–0.9)
EOSINOPHIL NFR BLD AUTO: 2.4 %
ERYTHROCYTE [DISTWIDTH] IN BLOOD BY AUTOMATED COUNT: 15.1 % (ref 11.5–17)
GFR SERPLBLD CREATININE-BSD FMLA CKD-EPI: >60 MLS/MIN/1.73/M2
GLOBULIN SER-MCNC: 3.5 GM/DL (ref 2.4–3.5)
GLUCOSE SERPL-MCNC: 104 MG/DL (ref 74–100)
HCT VFR BLD AUTO: 41.9 % (ref 37–47)
HGB BLD-MCNC: 13.6 G/DL (ref 12–16)
IMM GRANULOCYTES # BLD AUTO: 0.01 X10(3)/MCL (ref 0–0.04)
IMM GRANULOCYTES NFR BLD AUTO: 0.2 %
LYMPHOCYTES # BLD AUTO: 2.06 X10(3)/MCL (ref 0.6–4.6)
LYMPHOCYTES NFR BLD AUTO: 35 %
MCH RBC QN AUTO: 29.2 PG (ref 27–31)
MCHC RBC AUTO-ENTMCNC: 32.5 G/DL (ref 33–36)
MCV RBC AUTO: 90.1 FL (ref 80–94)
MONOCYTES # BLD AUTO: 0.45 X10(3)/MCL (ref 0.1–1.3)
MONOCYTES NFR BLD AUTO: 7.7 %
NEUTROPHILS # BLD AUTO: 3.19 X10(3)/MCL (ref 2.1–9.2)
NEUTROPHILS NFR BLD AUTO: 54.2 %
NRBC BLD AUTO-RTO: 0 %
PLATELET # BLD AUTO: 194 X10(3)/MCL (ref 130–400)
PMV BLD AUTO: 10.5 FL (ref 7.4–10.4)
POTASSIUM SERPL-SCNC: 3.7 MMOL/L (ref 3.5–5.1)
PROT SERPL-MCNC: 7.5 GM/DL (ref 6.4–8.3)
RBC # BLD AUTO: 4.65 X10(6)/MCL (ref 4.2–5.4)
SODIUM SERPL-SCNC: 144 MMOL/L (ref 136–145)
TROPONIN I SERPL-MCNC: <0.01 NG/ML (ref 0–0.04)
WBC # SPEC AUTO: 5.88 X10(3)/MCL (ref 4.5–11.5)

## 2023-05-16 PROCEDURE — 85025 COMPLETE CBC W/AUTO DIFF WBC: CPT | Performed by: NURSE PRACTITIONER

## 2023-05-16 PROCEDURE — 96372 THER/PROPH/DIAG INJ SC/IM: CPT | Performed by: NURSE PRACTITIONER

## 2023-05-16 PROCEDURE — 83880 ASSAY OF NATRIURETIC PEPTIDE: CPT | Performed by: NURSE PRACTITIONER

## 2023-05-16 PROCEDURE — 84484 ASSAY OF TROPONIN QUANT: CPT | Performed by: NURSE PRACTITIONER

## 2023-05-16 PROCEDURE — 80053 COMPREHEN METABOLIC PANEL: CPT | Performed by: NURSE PRACTITIONER

## 2023-05-16 PROCEDURE — 93005 ELECTROCARDIOGRAM TRACING: CPT

## 2023-05-16 PROCEDURE — 63600175 PHARM REV CODE 636 W HCPCS: Performed by: NURSE PRACTITIONER

## 2023-05-16 PROCEDURE — 99285 EMERGENCY DEPT VISIT HI MDM: CPT | Mod: 25

## 2023-05-16 RX ORDER — KETOROLAC TROMETHAMINE 10 MG/1
10 TABLET, FILM COATED ORAL EVERY 6 HOURS
Qty: 20 TABLET | Refills: 0 | Status: SHIPPED | OUTPATIENT
Start: 2023-05-16 | End: 2023-05-21

## 2023-05-16 RX ORDER — KETOROLAC TROMETHAMINE 30 MG/ML
30 INJECTION, SOLUTION INTRAMUSCULAR; INTRAVENOUS
Status: COMPLETED | OUTPATIENT
Start: 2023-05-16 | End: 2023-05-16

## 2023-05-16 RX ORDER — HYDROCODONE BITARTRATE AND ACETAMINOPHEN 5; 325 MG/1; MG/1
1 TABLET ORAL EVERY 8 HOURS PRN
Qty: 12 TABLET | Refills: 0 | Status: SHIPPED | OUTPATIENT
Start: 2023-05-16 | End: 2023-05-17 | Stop reason: RX

## 2023-05-16 RX ORDER — METHOCARBAMOL 500 MG/1
1000 TABLET, FILM COATED ORAL 3 TIMES DAILY PRN
Qty: 42 TABLET | Refills: 0 | Status: SHIPPED | OUTPATIENT
Start: 2023-05-16 | End: 2023-05-23

## 2023-05-16 RX ADMIN — KETOROLAC TROMETHAMINE 30 MG: 30 INJECTION, SOLUTION INTRAMUSCULAR at 12:05

## 2023-05-16 NOTE — Clinical Note
"Emy Dimasa"Stan was seen and treated in our emergency department on 5/16/2023.  She may return to work on 05/18/2023.       If you have any questions or concerns, please don't hesitate to call.      LEXI Lucero"

## 2023-05-16 NOTE — DISCHARGE INSTRUCTIONS
Toradol for pain/inflammation, take with food. Methocarbamol for muscle spasms/tightness. Norco for more severe pain as needed, do not take and drive. May use pillow for splinting but do not wrap anything around your chest to prevent expansion of your lungs. We want you to take normal to deep breaths still.     If symptoms change/worsen, return to ER.

## 2023-05-16 NOTE — ED TRIAGE NOTES
Pt c/o having a fall this morning hitting her right rib area against a a bedpost. States pain worse with movement and deep breathing. Denies hitting head. Pt states is also having sob.

## 2023-05-16 NOTE — ED PROVIDER NOTES
Encounter Date: 2023       History     Chief Complaint   Patient presents with    Fall     Pt c/o having a fall this morning hitting her right rib area against a a bedpost. States pain worse with movement and deep breathing. Denies hitting head. Pt states is also having sob.     See MDM    The history is provided by the patient. No  was used.   Review of patient's allergies indicates:   Allergen Reactions    Pcn [penicillins]      unknown     Past Medical History:   Diagnosis Date    ADHD (attention deficit hyperactivity disorder)     Anxiety     History of psychiatric care     tried ativan, Prozac, an adhd medication    History of psychiatric hospitalization     in the - for anxiety/OCD    Obsessive-compulsive disorder     Psychiatric problem     Therapy     in      No past surgical history on file.  Family History   Problem Relation Age of Onset    Breast cancer Mother 48    Heart disease Mother 58        MI    Diabetes Maternal Aunt     Heart disease Maternal Grandmother     ADD / ADHD Son     Anxiety disorder Son     ADD / ADHD Son      Social History     Tobacco Use    Smoking status: Former     Types: Cigarettes     Quit date: 2013     Years since quittin.8   Substance Use Topics    Alcohol use: Yes     Alcohol/week: 2.0 standard drinks     Types: 2 Glasses of wine per week     Comment: drinks occassionally     Drug use: No     Comment: remote THC use in college; none currently/recently     Review of Systems   Cardiovascular:  Positive for chest pain.   All other systems reviewed and are negative.    Physical Exam     Initial Vitals [23 1242]   BP Pulse Resp Temp SpO2   (!) 169/85 66 20 96.8 °F (36 °C) 100 %      MAP       --         Physical Exam    Nursing note and vitals reviewed.  Constitutional: She appears well-developed and well-nourished.   Cardiovascular:  Normal rate, regular rhythm and normal heart sounds.           Pulmonary/Chest: Breath sounds  normal. No respiratory distress. She exhibits tenderness.     No crepitus, no bruising. +ttp to the under side of the right breast over ribs and mid sternum area   Musculoskeletal:         General: Normal range of motion.     Neurological: She is alert and oriented to person, place, and time. She has normal strength.   Skin: Skin is warm and dry.   Psychiatric: She has a normal mood and affect.       ED Course   Procedures  Labs Reviewed   CBC WITH DIFFERENTIAL - Abnormal; Notable for the following components:       Result Value    MCHC 32.5 (*)     MPV 10.5 (*)     All other components within normal limits   COMPREHENSIVE METABOLIC PANEL - Abnormal; Notable for the following components:    Chloride 111 (*)     Glucose Level 104 (*)     All other components within normal limits   TROPONIN I - Normal   B-TYPE NATRIURETIC PEPTIDE - Normal     EKG Readings: (Independently Interpreted)   Initial Reading: No STEMI. Rhythm: Normal Sinus Rhythm. Heart Rate: 60. Ectopy: No Ectopy. ST Segments: Normal ST Segments. T Waves: Normal. Clinical Impression: Normal Sinus Rhythm   ECG Results              EKG 12-lead (In process)  Result time 05/16/23 13:00:18      In process by Interface, Lab In [facility] (05/16/23 13:00:18)                   Narrative:    Test Reason : R06.02,    Vent. Rate : 060 BPM     Atrial Rate : 060 BPM     P-R Int : 114 ms          QRS Dur : 082 ms      QT Int : 422 ms       P-R-T Axes : 034 021 049 degrees     QTc Int : 422 ms    Normal sinus rhythm with sinus arrhythmia  Normal ECG  When compared with ECG of 19-NOV-2022 13:04,  Criteria for Septal infarct are no longer Present    Referred By: [email or web address]   SELF           Confirmed By:                                   Imaging Results              X-Ray Ribs 2 View Right (Preliminary result)  Result time 05/16/23 14:05:24      Wet Read by LEXI Lucero (05/16/23 13:49:16, [facility] - Emergency Dept, Emergency Medicine)    No acute  findings                                     X-Ray Chest 1 View (Final result)  Result time 05/16/23 13:56:31      Final result by Samy Nettles MD (05/16/23 13:56:31)                   Impression:      No acute chest disease is identified.      Electronically signed by: Samy Nettles  Date:    05/16/2023  Time:    13:56               Narrative:    EXAMINATION:  XR CHEST 1 VIEW    CLINICAL HISTORY:  , Unspecified fall, initial encounter.    FINDINGS:  No alveolar consolidation, effusion, or pneumothorax is seen.   The thoracic aorta is normal  cardiac silhouette, central pulmonary vessels and mediastinum are normal in size and are grossly unremarkable.   visualized osseous structures are grossly unremarkable.                        Wet Read by LEXI Lucero (05/16/23 13:49:23, [facility] - Emergency Dept, Emergency Medicine)    No acute findings                                     Medications   ketorolac injection 30 mg (30 mg Intramuscular Given 5/16/23 1245)     Medical Decision Making:   Differential Diagnosis:   Includes but not limited to chest wall contusion, rib contusion, rib fracture, pneumothorax  Clinical Tests:   Lab Tests: Ordered and Reviewed  The following lab test(s) were unremarkable: CBC, CMP, Troponin and BNP  Radiological Study: Ordered and Reviewed  Medical Tests: Ordered and Reviewed  ED Management:  59 y/o female presents with trip and fall and hit bed post to right rib/chest wall area this AM around 0630. No head injury no loc. Since then having right side chest pain and then started to have pain with taking a breath and it progressively worsened. No meds taken. No n/v.     Xray neg for rib fracture or pneumothorax upon my interpretation, radiologist will over read. Labs unremarkable. Trop neg. Ekg unremarkable. Pain is palpable and did improve with toradol. Discussed likely contused s/t trauma. Will rX toradol, muscle reaxers, pain meds. Encouraged not to  wrap her chest. May use pillow for splinting.                         Clinical Impression:   Final diagnoses:  [R06.02] SOB (shortness of breath)  [W19.XXXA] Fall  [R07.81] Rib pain on right side (Primary)  [S20.211A] Contusion of right chest wall, initial encounter        ED Disposition Condition    Discharge Stable          ED Prescriptions       Medication Sig Dispense Start Date End Date Auth. Provider    ketorolac (TORADOL) 10 mg tablet Take 1 tablet (10 mg total) by mouth every 6 (six) hours. for 5 days 20 tablet 5/16/2023 5/21/2023 LEXI Lucero    methocarbamoL (ROBAXIN) 500 MG Tab Take 2 tablets (1,000 mg total) by mouth 3 (three) times daily as needed (muscle spasms/tightness). 42 tablet 5/16/2023 5/23/2023 LEXI Lucero    HYDROcodone-acetaminophen (NORCO) 5-325 mg per tablet Take 1 tablet by mouth every 8 (eight) hours as needed for Pain. 12 tablet 5/16/2023 5/20/2023 LEXI Lucero          Follow-up Information       Follow up With Specialties Details Why Contact Info    Steffen Puckett MD Internal Medicine Call in 1 week As needed, If symptoms worsen [id number]  [facility] [address]  [phone]               LEXI Lucero  05/16/23 7229

## 2023-05-17 RX ORDER — HYDROCODONE BITARTRATE AND ACETAMINOPHEN 7.5; 325 MG/1; MG/1
1 TABLET ORAL EVERY 8 HOURS PRN
Qty: 12 TABLET | Refills: 0 | Status: SHIPPED | OUTPATIENT
Start: 2023-05-17 | End: 2023-05-21

## 2023-06-08 ENCOUNTER — HOSPITAL ENCOUNTER (EMERGENCY)
Facility: HOSPITAL | Age: 58
Discharge: HOME | End: 2023-06-08
Attending: EMERGENCY MEDICINE
Payer: COMMERCIAL

## 2023-06-08 VITALS
RESPIRATION RATE: 17 BRPM | DIASTOLIC BLOOD PRESSURE: 57 MMHG | TEMPERATURE: 98 F | OXYGEN SATURATION: 96 % | HEART RATE: 71 BPM | SYSTOLIC BLOOD PRESSURE: 111 MMHG

## 2023-06-08 DIAGNOSIS — Z78.9 ALCOHOL USE: ICD-10-CM

## 2023-06-08 DIAGNOSIS — F43.21 COMPLICATED GRIEF: ICD-10-CM

## 2023-06-08 DIAGNOSIS — F12.90 MARIJUANA USE: ICD-10-CM

## 2023-06-08 DIAGNOSIS — F14.10 COCAINE ABUSE: ICD-10-CM

## 2023-06-08 DIAGNOSIS — R56.9 SEIZURE: Primary | ICD-10-CM

## 2023-06-08 LAB
ALBUMIN SERPL-MCNC: 4.2 G/DL (ref 3.5–5)
ALBUMIN/GLOB SERPL: 1.4 RATIO (ref 1.1–2)
ALP SERPL-CCNC: 63 UNIT/L (ref 40–150)
ALT SERPL-CCNC: 11 UNIT/L (ref 0–55)
AMPHET UR QL SCN: NEGATIVE
APPEARANCE UR: CLEAR
AST SERPL-CCNC: 12 UNIT/L (ref 5–34)
BACTERIA #/AREA URNS AUTO: NORMAL /HPF
BARBITURATE SCN PRESENT UR: NEGATIVE
BASOPHILS # BLD AUTO: 0.02 X10(3)/MCL
BASOPHILS NFR BLD AUTO: 0.3 %
BENZODIAZ UR QL SCN: POSITIVE
BILIRUB UR QL STRIP.AUTO: NEGATIVE MG/DL
BILIRUBIN DIRECT+TOT PNL SERPL-MCNC: 0.4 MG/DL
BUN SERPL-MCNC: 5.9 MG/DL (ref 9.8–20.1)
CALCIUM SERPL-MCNC: 9.1 MG/DL (ref 8.4–10.2)
CANNABINOIDS UR QL SCN: POSITIVE
CHLORIDE SERPL-SCNC: 108 MMOL/L (ref 98–107)
CO2 SERPL-SCNC: 24 MMOL/L (ref 22–29)
COCAINE UR QL SCN: POSITIVE
COLOR UR: YELLOW
CREAT SERPL-MCNC: 0.87 MG/DL (ref 0.55–1.02)
EOSINOPHIL # BLD AUTO: 0.09 X10(3)/MCL (ref 0–0.9)
EOSINOPHIL NFR BLD AUTO: 1.5 %
ERYTHROCYTE [DISTWIDTH] IN BLOOD BY AUTOMATED COUNT: 14.7 % (ref 11.5–17)
ETHANOL SERPL-MCNC: 174 MG/DL
FENTANYL UR QL SCN: NEGATIVE
GFR SERPLBLD CREATININE-BSD FMLA CKD-EPI: >60 MLS/MIN/1.73/M2
GLOBULIN SER-MCNC: 3.1 GM/DL (ref 2.4–3.5)
GLUCOSE SERPL-MCNC: 91 MG/DL (ref 74–100)
GLUCOSE UR QL STRIP.AUTO: NEGATIVE MG/DL
HCT VFR BLD AUTO: 43.7 % (ref 37–47)
HGB BLD-MCNC: 14.2 G/DL (ref 12–16)
IMM GRANULOCYTES # BLD AUTO: 0.01 X10(3)/MCL (ref 0–0.04)
IMM GRANULOCYTES NFR BLD AUTO: 0.2 %
KETONES UR QL STRIP.AUTO: NEGATIVE MG/DL
LEUKOCYTE ESTERASE UR QL STRIP.AUTO: NEGATIVE UNIT/L
LYMPHOCYTES # BLD AUTO: 3.55 X10(3)/MCL (ref 0.6–4.6)
LYMPHOCYTES NFR BLD AUTO: 60.2 %
MCH RBC QN AUTO: 29.3 PG (ref 27–31)
MCHC RBC AUTO-ENTMCNC: 32.5 G/DL (ref 33–36)
MCV RBC AUTO: 90.1 FL (ref 80–94)
MDMA UR QL SCN: NEGATIVE
MONOCYTES # BLD AUTO: 0.29 X10(3)/MCL (ref 0.1–1.3)
MONOCYTES NFR BLD AUTO: 4.9 %
NEUTROPHILS # BLD AUTO: 1.94 X10(3)/MCL (ref 2.1–9.2)
NEUTROPHILS NFR BLD AUTO: 32.9 %
NITRITE UR QL STRIP.AUTO: NEGATIVE
NRBC BLD AUTO-RTO: 0 %
OPIATES UR QL SCN: NEGATIVE
PCP UR QL: NEGATIVE
PH UR STRIP.AUTO: 6.5 [PH]
PH UR: 6.5 [PH] (ref 3–11)
PLATELET # BLD AUTO: 190 X10(3)/MCL (ref 130–400)
PMV BLD AUTO: 10.8 FL (ref 7.4–10.4)
POTASSIUM SERPL-SCNC: 3.8 MMOL/L (ref 3.5–5.1)
PROT SERPL-MCNC: 7.3 GM/DL (ref 6.4–8.3)
PROT UR QL STRIP.AUTO: NEGATIVE MG/DL
RBC # BLD AUTO: 4.85 X10(6)/MCL (ref 4.2–5.4)
RBC #/AREA URNS AUTO: NORMAL /HPF
RBC UR QL AUTO: NEGATIVE UNIT/L
SODIUM SERPL-SCNC: 142 MMOL/L (ref 136–145)
SP GR UR STRIP.AUTO: 1 (ref 1–1.03)
SQUAMOUS #/AREA URNS AUTO: NORMAL /HPF
UROBILINOGEN UR STRIP-ACNC: 1 MG/DL
WBC # SPEC AUTO: 5.9 X10(3)/MCL (ref 4.5–11.5)
WBC #/AREA URNS AUTO: NORMAL /HPF

## 2023-06-08 PROCEDURE — 85025 COMPLETE CBC W/AUTO DIFF WBC: CPT | Performed by: EMERGENCY MEDICINE

## 2023-06-08 PROCEDURE — 99284 EMERGENCY DEPT VISIT MOD MDM: CPT | Mod: 25

## 2023-06-08 PROCEDURE — 80307 DRUG TEST PRSMV CHEM ANLYZR: CPT | Performed by: EMERGENCY MEDICINE

## 2023-06-08 PROCEDURE — 80053 COMPREHEN METABOLIC PANEL: CPT | Performed by: EMERGENCY MEDICINE

## 2023-06-08 PROCEDURE — 82077 ASSAY SPEC XCP UR&BREATH IA: CPT | Performed by: EMERGENCY MEDICINE

## 2023-06-08 PROCEDURE — 63600175 PHARM REV CODE 636 W HCPCS: Performed by: EMERGENCY MEDICINE

## 2023-06-08 PROCEDURE — 96365 THER/PROPH/DIAG IV INF INIT: CPT

## 2023-06-08 PROCEDURE — 81001 URINALYSIS AUTO W/SCOPE: CPT | Performed by: EMERGENCY MEDICINE

## 2023-06-08 RX ORDER — LEVETIRACETAM 15 MG/ML
1500 INJECTION INTRAVASCULAR
Status: COMPLETED | OUTPATIENT
Start: 2023-06-08 | End: 2023-06-08

## 2023-06-08 RX ORDER — LEVETIRACETAM 1000 MG/1
1000 TABLET ORAL 2 TIMES DAILY
Qty: 60 TABLET | Refills: 0 | Status: SHIPPED | OUTPATIENT
Start: 2023-06-08 | End: 2023-07-08

## 2023-06-08 RX ADMIN — LEVETIRACETAM INJECTION 1500 MG: 15 INJECTION INTRAVENOUS at 07:06

## 2023-06-08 NOTE — ED PROVIDER NOTES
"Encounter Date: 2023       History     Chief Complaint   Patient presents with    Seizures     Dx w/ seizure 8 months ago and rx keppra - never filled rx, family reports 10x seizure tonight, +etoh, 2 focal seizure and 1 grand mal seizure w/ aasi pta, 2.5mg versed pta     58 F h/o ADHD, OCD, seizures presents emergency department after a seizure episode at home.  EMS reports that the patient was at home with family has been drinking alcohol that they noticed "10 seizures" at home.  EMS states they witnessed focal seizures but that she had 1 generalized tonic-clonic seizure in the ambulance.  They gave Versed EN route and she has not had any additional seizure activity.  Patient was prescribed Keppra 8 months ago but never filled the prescription.  She has no other complaints.      Review of patient's allergies indicates:   Allergen Reactions    Pcn [penicillins]      unknown     Past Medical History:   Diagnosis Date    ADHD (attention deficit hyperactivity disorder)     Anxiety     History of psychiatric care     tried ativan, Prozac, an adhd medication    History of psychiatric hospitalization     in the - for anxiety/OCD    Obsessive-compulsive disorder     Psychiatric problem     Therapy     in      No past surgical history on file.  Family History   Problem Relation Age of Onset    Breast cancer Mother 48    Heart disease Mother 58        MI    Diabetes Maternal Aunt     Heart disease Maternal Grandmother     ADD / ADHD Son     Anxiety disorder Son     ADD / ADHD Son      Social History     Tobacco Use    Smoking status: Former     Types: Cigarettes     Quit date: 2013     Years since quittin.9   Substance Use Topics    Alcohol use: Yes     Alcohol/week: 2.0 standard drinks     Types: 2 Glasses of wine per week     Comment: drinks occassionally     Drug use: No     Comment: remote THC use in college; none currently/recently     Review of Systems   Constitutional:  Negative for chills and " fever.   Respiratory:  Negative for cough, chest tightness and shortness of breath.    Cardiovascular:  Negative for chest pain.   Gastrointestinal:  Negative for abdominal pain, nausea and vomiting.   Musculoskeletal:  Negative for myalgias and neck pain.   Neurological:  Positive for seizures.   All other systems reviewed and are negative.    Physical Exam     Initial Vitals [06/08/23 0648]   BP Pulse Resp Temp SpO2   125/80 86 16 98 °F (36.7 °C) 98 %      MAP       --         Physical Exam    Nursing note and vitals reviewed.  Constitutional: She appears well-developed and well-nourished. No distress.   Smells like alcohol   HENT:   Head: Normocephalic and atraumatic.   Eyes: Conjunctivae are normal.   Cardiovascular:  Normal rate and intact distal pulses.           Pulmonary/Chest: No respiratory distress. She has no rhonchi.   Abdominal: Abdomen is soft. Bowel sounds are normal. There is no abdominal tenderness. There is no rebound and no guarding.   Musculoskeletal:         General: No edema.     Neurological: She is alert and oriented to person, place, and time. She has normal strength. GCS score is 15. GCS eye subscore is 4. GCS verbal subscore is 5. GCS motor subscore is 6.   Skin: Skin is warm and dry.   Psychiatric: She has a normal mood and affect.       ED Course   Procedures  Labs Reviewed   COMPREHENSIVE METABOLIC PANEL - Abnormal; Notable for the following components:       Result Value    Chloride 108 (*)     Blood Urea Nitrogen 5.9 (*)     All other components within normal limits   CBC WITH DIFFERENTIAL - Abnormal; Notable for the following components:    MCHC 32.5 (*)     MPV 10.8 (*)     Neut # 1.94 (*)     All other components within normal limits   DRUG SCREEN, URINE (BEAKER) - Abnormal; Notable for the following components:    Benzodiazepine, Urine Positive (*)     Cannabinoids, Urine Positive (*)     Cocaine, Urine Positive (*)     All other components within normal limits    Narrative:      Cut off concentrations:    Amphetamines - 1000 ng/ml  Barbiturates - 200 ng/ml  Benzodiazepine - 200 ng/ml  Cannabinoids (THC) - 50 ng/ml  Cocaine - 300 ng/ml  Fentanyl - 1.0 ng/ml  MDMA - 500 ng/ml  Opiates - 300 ng/ml   Phencyclidine (PCP) - 25 ng/ml    Specimen submitted for drug analysis and tested for pH and specific gravity in order to evaluate sample integrity. Suspect tampering if specific gravity is <1.003 and/or pH is not within the range of 4.5 - 8.0  False negatives may result form substances such as bleach added to urine.  False positives may result for the presence of a substance with similar chemical structure to the drug or its metabolite.    This test provides only a PRELIMINARY analytical test result. A more specific alternate chemical method must be used in order to obtain a confirmed analytical result. Gas chromatography/mass spectrometry (GC/MS) is the preferred confirmatory method. Other chemical confirmation methods are available. Clinical consideration and professional judgement should be applied to any drug of abuse test result, particularly when preliminary positive results are used.    Positive results will be confirmed only at the physicians request. Unconfirmed screening results are to be used only for medical purposes (treatment).        ALCOHOL,MEDICAL (ETHANOL) - Abnormal; Notable for the following components:    Ethanol Level 174.0 (*)     All other components within normal limits   URINALYSIS, REFLEX TO URINE CULTURE - Normal   URINALYSIS, MICROSCOPIC - Normal   CBC W/ AUTO DIFFERENTIAL    Narrative:     The following orders were created for panel order CBC auto differential.  Procedure                               Abnormality         Status                     ---------                               -----------         ------                     CBC with Differential[[id number]]        Abnormal            Final result                 Please view results for these tests on the  "individual orders.          Imaging Results    None          Medications   levETIRAcetam in NaCl (iso-os) IVPB 1,500 mg (0 mg Intravenous Stopped 6/8/23 0814)                 ED Course as of 06/08/23 1255   Thu Jun 08, 2023   1222 Patient has now returned to her baseline and had been written for discharge by my colleague prior to shift change; however, patient now more alert, states that she had started increased alcohol and drug use due to suicidal thoughts.  In further discussion with the patient, she states that she does not have active suicidal thoughts at this time and does not want to hurt herself, that she was just trying to communicate that she has been stressed and that is why she had stopped taking her medications.  She reports that several weeks ago she had taken an excessive medication and had hoped to not wake up however has felt better since that time and is only here today because of the breakthrough seizure due to noncompliance.  She states that she follows with a psychiatrist and has been under immense stress with the death of her  and impending death of her son.  Is pleading with me that she is not an active threat to herself and that she needs to be with her son because he could "die any day ".    I discussed with her that if she is having active suicidal thoughts, we need to act in her best interest and place her under a psychiatric commitment to get her emergently evaluated.  She again confirms that she is not having active suicidal thoughts and does not have any intent of self-harm at this time - verifying that she was just expressing that she is been under a lot of stress when she spoke to the nurse earlier.  I offered additional bereavement resources and supportive therapies and we discussed a contract for safety that, should she experience any thoughts of self-harm now or in the future, she call us suicide crisis line or 911 and return immediately for re-evaluation.  She agrees to " this. [KS]      ED Course User Index  [KS] Tomasa Ha MD          Medical Decision Making  Witnessed generalized tonic-clonic seizure activity.  Noncompliant with medication.  Using alcohol.    Discussed with the patient that she can not drink alcohol with her seizure history is at increase his risk of seizures.  Discussed the importance of taking her medications.  Will load her with IV Keppra and rewrite her prescription    Drug screen with marijuana cocaine and benzodiazepines.  Benzodiazepines were given by EMS.  Alcohol elevated as well consistent with history.  Patient is sleeping wakes up tells me her name knows she is in [place].  Appears to still be somewhat intoxicated.  When she is clinically sober she can be discharged    Problems Addressed:  Alcohol use: acute illness or injury  Seizure: chronic illness or injury with exacerbation, progression, or side effects of treatment    Risk  Prescription drug management.  Parenteral controlled substances.  Drug therapy requiring intensive monitoring for toxicity.             Clinical Impression:   Final diagnoses:  [Z78.9] Alcohol use  [R56.9] Seizure (Primary)  [F14.10] Cocaine abuse  [F12.90] Marijuana use  [F43.21] Complicated grief        ED Disposition Condition    Discharge Stable            ED Prescriptions       Medication Sig Dispense Start Date End Date Auth. Provider    levETIRAcetam (KEPPRA) 1000 MG tablet Take 1 tablet (1,000 mg total) by mouth 2 (two) times daily. 60 tablet 6/8/2023 7/8/2023 Bossman Gerardo MD          Follow-up Information       Follow up With Specialties Details Why Contact Info    Steffen Puckett MD Internal Medicine Schedule an appointment as soon as possible for a visit   [address]  [place] [address]  [phone]      Primary care provider  Schedule an appointment as soon as possible for a visit  You can call [phone] to get set up with a local primary care provider within the next few days.If your  symptoms worsen or change please return to the emergency department for re-evaluation Call your primary care provider to schedule a follow-up appointment within a week    Your Psychiatric Provider  Schedule an appointment as soon as possible for a visit in 3 days                 Bossman Gerardo MD  06/08/23 0812       Bossman Gerardo MD  06/08/23 0945       Tomasa Ha MD  06/08/23 1227       Tomasa Ha MD  06/08/23 1256

## 2023-06-08 NOTE — DISCHARGE INSTRUCTIONS
Agency:  Contact Information:  Services Provided:  Insurance Accepted:    [facility] [address] [place] [address]  [phone] Adults: Detox; inpatient; outpatient; partial inpatient Private Insurance    [facility] [address], [address]th [place] [address]  [phone] Adults: Detox; inpatient Private Insurance   [facility] [address] [place] [address]  [phone] Adults and Adolescents (13-17): Inpatient; outpatient; transitional living; family therapy Medicare  Medicaid  Private Pay  Sliding Scale (Uninsured)   [facility] [address] [place] [address]  [phone] Adults: Detox; inpatient; pregnant women Medicaid  Private Insurance   [facility] [address] [place] [address]  [phone] Adults: Detox; Detox for pregnant women Medicare    Private Insurance  Medicaid   [facility] [address] [place] [address]  [phone] Adults: Detox; inpatient; group therapy Medicare  Medicaid  Private Insurance   [facility] [address] [place], [place] (Adult)     [address], [place] (Adolescent)    [phone] (Weekdays)  [phone] (Weekends) Adults and Adolescents (12-17): Inpatient; outpatient  Family: Pregnant women and women w/ kids up to 12-residental living recovery  Medicaid  Medicare  Private Insurance   [facility] [address] [place] [address]  [phone]. [phone] Adults: Detox; Inpatient Medicaid  Private Insurance   [facility] [address] [place] [address]  [phone] Adults: Outpatient Private Insurance    [facility] [address] [place] [address]  [phone] Adult: Detox; inpatient Medicaid  Medicare    Private Insurance   [facility] [address] [place] [address]  [phone] Adults: Outpatient   Medicaid  Private Insurance  Self-pay   [facility] [address] [place] [address]  [phone] Adults: Detox; Inpatient   Medicaid  Medicare  Private Insurance  VA Benefits   [facility] [address]  [place] [address]  [phone] All Ages: Substance abuse services and referrals for medical detox. Self pay, Medicaid, Medicare, Private insurance   [facility] [address] [place] [address]  [phone]    [address] [place] [address]  [phone] Adults: Outpatient; outpatient for use during pregnancy  No insurance accepted  Private pay only    [facility] [address], [place] [address]  [phone] Adults: Inpatient Medicare  Private Insurance    [facility] [address] [place], [place] [address] [phone] Adults: Detox; inpatient; relapse program; intensive outpatient; family therapy Medicaid  Private Insurance    [address] [id number] [phone] Adults: Detox; inpatient  Medicaid  Private Insurance    [place] [facility] [address] [place], [place] [address] [phone] Adults: Detox; inpatient; treatment for pregnant women Medicaid  Sliding Scale (Uninsured)   [facility] [place] [address] [place] [address]  [phone] Adults: Inpatient; Partial inpatient; outpatient Medicaid  Private Insurance   [facility] [place] [address]  [place] [address]  [phone] Adults: Outpatient Private Insurance    [facility] [address] [place] [address]  [phone] Adults: Inpatient; outpatient; family support; aftercare support Private Insurance   [facility]  [address]  [place] [address]  [phone] Adults and Adolescents (12-17): Detox; outpatient Medicaid  Medicare  Private Insurance   [facility] [address] [place] [place] [address]  [phone] Adults: Detox, Inpatient, Outpatient Private Insurance   24 HR Hotline:       [facility]  1-779.977.3329 (HELP) Free 24 HR assistance  N/A   National Helpline for Substance abuse 1-519.829.7749 Free 24 HR assistance N/A   Cocaine Anonymous 1-432.493.8521 Free 24 HR assistance  N/A   Poison Control-Overdose Hotline 1-323.627.7337 Free 24 HR assistance   N/A   Alcohol and Drug Helpline 1-783.828.1111 Free 24 HR assistance  N/A   National [place] of   Problem Gambling Helpline 1-850.990.1947 Free 24 HR assistance N/A

## 2023-06-08 NOTE — PROVIDER PROGRESS NOTES - EMERGENCY DEPT.
Encounter Date: 6/8/2023    ED Physician Progress Notes            ED Course as of 06/08/23 1225   Thu Jun 08, 2023   1222 Patient has now returned to her baseline and had been written for discharge by my colleague prior to shift change; however, patient now more alert, states that she had started increased alcohol and drug use due to suicidal thoughts.  A physician's Emergency certificate has been filed for danger to self.  I have reviewed the laboratory studies obtained by my colleague earlier today which demonstrated no acute kidney injury or evidence of myoglobinuria.  Positive urine drug screen was noted.  Alcohol was elevated at 174.  Will plan to repeat alcohol level now to ensure that it is less than 150 as this is the acceptable range for most psychiatric treatment facility.  Otherwise, she is clinically stable for transfer for inpatient psychiatric evaluation and treatment. [KS]      ED Course User Index  [KS] Tomasa Ha MD